# Patient Record
Sex: MALE | Race: WHITE | NOT HISPANIC OR LATINO | Employment: OTHER | URBAN - METROPOLITAN AREA
[De-identification: names, ages, dates, MRNs, and addresses within clinical notes are randomized per-mention and may not be internally consistent; named-entity substitution may affect disease eponyms.]

---

## 2017-11-03 ENCOUNTER — AMBULATORY - HEALTHEAST (OUTPATIENT)
Dept: FAMILY MEDICINE | Facility: CLINIC | Age: 67
End: 2017-11-03

## 2017-11-03 DIAGNOSIS — B07.9 WART: ICD-10-CM

## 2017-11-03 DIAGNOSIS — J30.9 ALLERGIC RHINITIS: ICD-10-CM

## 2017-11-03 DIAGNOSIS — Z00.00 ROUTINE ADULT HEALTH MAINTENANCE: ICD-10-CM

## 2017-11-03 DIAGNOSIS — K21.9 GASTROESOPHAGEAL REFLUX DISEASE WITHOUT ESOPHAGITIS: ICD-10-CM

## 2017-11-03 DIAGNOSIS — J44.9 COPD (CHRONIC OBSTRUCTIVE PULMONARY DISEASE) (H): ICD-10-CM

## 2017-11-03 ASSESSMENT — MIFFLIN-ST. JEOR: SCORE: 1471.49

## 2018-01-09 ENCOUNTER — COMMUNICATION - HEALTHEAST (OUTPATIENT)
Dept: SCHEDULING | Facility: CLINIC | Age: 68
End: 2018-01-09

## 2018-01-09 ENCOUNTER — OFFICE VISIT - HEALTHEAST (OUTPATIENT)
Dept: FAMILY MEDICINE | Facility: CLINIC | Age: 68
End: 2018-01-09

## 2018-01-09 DIAGNOSIS — M67.40 GANGLION CYST: ICD-10-CM

## 2018-01-09 ASSESSMENT — MIFFLIN-ST. JEOR: SCORE: 1493.89

## 2018-01-10 ENCOUNTER — RECORDS - HEALTHEAST (OUTPATIENT)
Dept: ADMINISTRATIVE | Facility: OTHER | Age: 68
End: 2018-01-10

## 2019-06-10 ENCOUNTER — RECORDS - HEALTHEAST (OUTPATIENT)
Dept: ADMINISTRATIVE | Facility: OTHER | Age: 69
End: 2019-06-10

## 2019-07-08 ENCOUNTER — RECORDS - HEALTHEAST (OUTPATIENT)
Dept: ADMINISTRATIVE | Facility: OTHER | Age: 69
End: 2019-07-08

## 2019-07-15 ENCOUNTER — RECORDS - HEALTHEAST (OUTPATIENT)
Dept: ADMINISTRATIVE | Facility: OTHER | Age: 69
End: 2019-07-15

## 2019-09-03 ENCOUNTER — COMMUNICATION - HEALTHEAST (OUTPATIENT)
Dept: FAMILY MEDICINE | Facility: CLINIC | Age: 69
End: 2019-09-03

## 2019-09-30 ENCOUNTER — OFFICE VISIT - HEALTHEAST (OUTPATIENT)
Dept: FAMILY MEDICINE | Facility: CLINIC | Age: 69
End: 2019-09-30

## 2019-09-30 ENCOUNTER — COMMUNICATION - HEALTHEAST (OUTPATIENT)
Dept: SCHEDULING | Facility: CLINIC | Age: 69
End: 2019-09-30

## 2019-09-30 DIAGNOSIS — J44.1 COPD WITH RESPIRATORY DISTRESS, ACUTE (H): ICD-10-CM

## 2019-09-30 DIAGNOSIS — L30.9 ECZEMA, UNSPECIFIED TYPE: ICD-10-CM

## 2019-09-30 DIAGNOSIS — Z00.00 ROUTINE ADULT HEALTH MAINTENANCE: ICD-10-CM

## 2020-01-20 ENCOUNTER — OFFICE VISIT - HEALTHEAST (OUTPATIENT)
Dept: FAMILY MEDICINE | Facility: CLINIC | Age: 70
End: 2020-01-20

## 2020-01-20 DIAGNOSIS — F17.200 SMOKING: ICD-10-CM

## 2020-01-20 DIAGNOSIS — J01.90 ACUTE NON-RECURRENT SINUSITIS, UNSPECIFIED LOCATION: ICD-10-CM

## 2020-03-02 ENCOUNTER — COMMUNICATION - HEALTHEAST (OUTPATIENT)
Dept: FAMILY MEDICINE | Facility: CLINIC | Age: 70
End: 2020-03-02

## 2020-03-02 DIAGNOSIS — J44.1 COPD WITH RESPIRATORY DISTRESS, ACUTE (H): ICD-10-CM

## 2020-11-09 ENCOUNTER — OFFICE VISIT - HEALTHEAST (OUTPATIENT)
Dept: FAMILY MEDICINE | Facility: CLINIC | Age: 70
End: 2020-11-09

## 2020-11-09 DIAGNOSIS — J01.90 ACUTE NON-RECURRENT SINUSITIS, UNSPECIFIED LOCATION: ICD-10-CM

## 2020-12-01 ENCOUNTER — COMMUNICATION - HEALTHEAST (OUTPATIENT)
Dept: FAMILY MEDICINE | Facility: CLINIC | Age: 70
End: 2020-12-01

## 2020-12-16 ENCOUNTER — OFFICE VISIT - HEALTHEAST (OUTPATIENT)
Dept: FAMILY MEDICINE | Facility: CLINIC | Age: 70
End: 2020-12-16

## 2020-12-16 DIAGNOSIS — J01.90 ACUTE NON-RECURRENT SINUSITIS, UNSPECIFIED LOCATION: ICD-10-CM

## 2020-12-16 DIAGNOSIS — L30.9 ECZEMA, UNSPECIFIED TYPE: ICD-10-CM

## 2020-12-16 DIAGNOSIS — J44.1 COPD WITH RESPIRATORY DISTRESS, ACUTE (H): ICD-10-CM

## 2020-12-16 RX ORDER — CLOBETASOL PROPIONATE 0.5 MG/G
CREAM TOPICAL
Qty: 30 G | Refills: 5 | Status: SHIPPED | OUTPATIENT
Start: 2020-12-16 | End: 2021-10-04

## 2021-01-13 ENCOUNTER — OFFICE VISIT - HEALTHEAST (OUTPATIENT)
Dept: FAMILY MEDICINE | Facility: CLINIC | Age: 71
End: 2021-01-13

## 2021-01-13 DIAGNOSIS — E11.9 TYPE 2 DIABETES MELLITUS WITHOUT COMPLICATION, WITHOUT LONG-TERM CURRENT USE OF INSULIN (H): ICD-10-CM

## 2021-01-13 DIAGNOSIS — Z12.5 ENCOUNTER FOR SCREENING FOR MALIGNANT NEOPLASM OF PROSTATE: ICD-10-CM

## 2021-01-13 DIAGNOSIS — N40.1 BENIGN PROSTATIC HYPERPLASIA WITH LOWER URINARY TRACT SYMPTOMS, SYMPTOM DETAILS UNSPECIFIED: ICD-10-CM

## 2021-01-13 DIAGNOSIS — Z00.00 WELLNESS EXAMINATION: ICD-10-CM

## 2021-01-13 DIAGNOSIS — Z12.11 SCREEN FOR COLON CANCER: ICD-10-CM

## 2021-01-13 DIAGNOSIS — J44.1 COPD WITH RESPIRATORY DISTRESS, ACUTE (H): ICD-10-CM

## 2021-01-13 DIAGNOSIS — D22.9 ATYPICAL NEVI: ICD-10-CM

## 2021-01-13 LAB
ANION GAP SERPL CALCULATED.3IONS-SCNC: 12 MMOL/L (ref 5–18)
BUN SERPL-MCNC: 16 MG/DL (ref 8–22)
CALCIUM SERPL-MCNC: 9.4 MG/DL (ref 8.5–10.5)
CHLORIDE BLD-SCNC: 99 MMOL/L (ref 98–107)
CHOLEST SERPL-MCNC: 284 MG/DL
CO2 SERPL-SCNC: 23 MMOL/L (ref 22–31)
CREAT SERPL-MCNC: 0.87 MG/DL (ref 0.7–1.3)
ERYTHROCYTE [DISTWIDTH] IN BLOOD BY AUTOMATED COUNT: 12.2 % (ref 11–14.5)
FASTING STATUS PATIENT QL REPORTED: ABNORMAL
GFR SERPL CREATININE-BSD FRML MDRD: >60 ML/MIN/1.73M2
GLUCOSE BLD-MCNC: 331 MG/DL (ref 70–125)
HBA1C MFR BLD: 12.9 %
HCT VFR BLD AUTO: 49.8 % (ref 40–54)
HDLC SERPL-MCNC: 38 MG/DL
HGB BLD-MCNC: 16.6 G/DL (ref 14–18)
LDLC SERPL CALC-MCNC: 202 MG/DL
MCH RBC QN AUTO: 30.7 PG (ref 27–34)
MCHC RBC AUTO-ENTMCNC: 33.4 G/DL (ref 32–36)
MCV RBC AUTO: 92 FL (ref 80–100)
PLATELET # BLD AUTO: 246 THOU/UL (ref 140–440)
PMV BLD AUTO: 7.6 FL (ref 7–10)
POTASSIUM BLD-SCNC: 5 MMOL/L (ref 3.5–5)
PSA SERPL-MCNC: 1 NG/ML (ref 0–4.5)
RBC # BLD AUTO: 5.41 MILL/UL (ref 4.4–6.2)
SODIUM SERPL-SCNC: 134 MMOL/L (ref 136–145)
TRIGL SERPL-MCNC: 222 MG/DL
WBC: 6.1 THOU/UL (ref 4–11)

## 2021-01-13 RX ORDER — TIOTROPIUM BROMIDE 18 UG/1
18 CAPSULE ORAL; RESPIRATORY (INHALATION) DAILY
Qty: 90 CAPSULE | Refills: 3 | Status: SHIPPED | OUTPATIENT
Start: 2021-01-13 | End: 2021-10-04

## 2021-01-13 RX ORDER — TAMSULOSIN HYDROCHLORIDE 0.4 MG/1
0.4 CAPSULE ORAL
Qty: 90 CAPSULE | Refills: 3 | Status: SHIPPED | OUTPATIENT
Start: 2021-01-13 | End: 2021-10-04

## 2021-01-13 ASSESSMENT — MIFFLIN-ST. JEOR: SCORE: 1474.33

## 2021-01-14 ENCOUNTER — COMMUNICATION - HEALTHEAST (OUTPATIENT)
Dept: FAMILY MEDICINE | Facility: CLINIC | Age: 71
End: 2021-01-14

## 2021-01-14 DIAGNOSIS — J44.1 COPD WITH RESPIRATORY DISTRESS, ACUTE (H): ICD-10-CM

## 2021-01-14 RX ORDER — ALBUTEROL SULFATE 90 UG/1
2 AEROSOL, METERED RESPIRATORY (INHALATION) EVERY 6 HOURS PRN
Qty: 3 EACH | Refills: 0 | Status: SHIPPED | OUTPATIENT
Start: 2021-01-14

## 2021-01-28 ENCOUNTER — AMBULATORY - HEALTHEAST (OUTPATIENT)
Dept: EDUCATION SERVICES | Facility: CLINIC | Age: 71
End: 2021-01-28

## 2021-01-28 ENCOUNTER — COMMUNICATION - HEALTHEAST (OUTPATIENT)
Dept: FAMILY MEDICINE | Facility: CLINIC | Age: 71
End: 2021-01-28

## 2021-01-28 DIAGNOSIS — E11.9 TYPE 2 DIABETES MELLITUS WITHOUT COMPLICATION, WITHOUT LONG-TERM CURRENT USE OF INSULIN (H): ICD-10-CM

## 2021-02-01 ENCOUNTER — COMMUNICATION - HEALTHEAST (OUTPATIENT)
Dept: SCHEDULING | Facility: CLINIC | Age: 71
End: 2021-02-01

## 2021-02-03 ENCOUNTER — RECORDS - HEALTHEAST (OUTPATIENT)
Dept: ADMINISTRATIVE | Facility: OTHER | Age: 71
End: 2021-02-03

## 2021-02-18 ENCOUNTER — RECORDS - HEALTHEAST (OUTPATIENT)
Dept: ADMINISTRATIVE | Facility: OTHER | Age: 71
End: 2021-02-18

## 2021-02-19 ENCOUNTER — AMBULATORY - HEALTHEAST (OUTPATIENT)
Dept: EDUCATION SERVICES | Facility: CLINIC | Age: 71
End: 2021-02-19

## 2021-02-19 DIAGNOSIS — E11.9 TYPE 2 DIABETES MELLITUS WITHOUT COMPLICATION, WITHOUT LONG-TERM CURRENT USE OF INSULIN (H): ICD-10-CM

## 2021-03-19 ENCOUNTER — AMBULATORY - HEALTHEAST (OUTPATIENT)
Dept: EDUCATION SERVICES | Facility: CLINIC | Age: 71
End: 2021-03-19

## 2021-03-19 DIAGNOSIS — E11.9 TYPE 2 DIABETES MELLITUS WITHOUT COMPLICATION, WITHOUT LONG-TERM CURRENT USE OF INSULIN (H): ICD-10-CM

## 2021-04-11 ENCOUNTER — COMMUNICATION - HEALTHEAST (OUTPATIENT)
Dept: SCHEDULING | Facility: CLINIC | Age: 71
End: 2021-04-11

## 2021-04-12 ENCOUNTER — AMBULATORY - HEALTHEAST (OUTPATIENT)
Dept: LAB | Facility: CLINIC | Age: 71
End: 2021-04-12

## 2021-04-12 DIAGNOSIS — E11.9 TYPE 2 DIABETES MELLITUS WITHOUT COMPLICATION, WITHOUT LONG-TERM CURRENT USE OF INSULIN (H): ICD-10-CM

## 2021-04-12 LAB — HBA1C MFR BLD: 6.9 %

## 2021-04-14 ENCOUNTER — COMMUNICATION - HEALTHEAST (OUTPATIENT)
Dept: FAMILY MEDICINE | Facility: CLINIC | Age: 71
End: 2021-04-14

## 2021-04-19 ENCOUNTER — OFFICE VISIT - HEALTHEAST (OUTPATIENT)
Dept: FAMILY MEDICINE | Facility: CLINIC | Age: 71
End: 2021-04-19

## 2021-04-19 DIAGNOSIS — E11.9 TYPE 2 DIABETES MELLITUS WITHOUT COMPLICATION, WITHOUT LONG-TERM CURRENT USE OF INSULIN (H): ICD-10-CM

## 2021-04-19 RX ORDER — METFORMIN HCL 500 MG
1000 TABLET, EXTENDED RELEASE 24 HR ORAL
Qty: 180 TABLET | Refills: 3 | Status: SHIPPED | OUTPATIENT
Start: 2021-04-19 | End: 2022-05-26

## 2021-04-19 ASSESSMENT — MIFFLIN-ST. JEOR: SCORE: 1474.33

## 2021-05-04 ENCOUNTER — AMBULATORY - HEALTHEAST (OUTPATIENT)
Dept: EDUCATION SERVICES | Facility: CLINIC | Age: 71
End: 2021-05-04

## 2021-05-04 DIAGNOSIS — E11.9 TYPE 2 DIABETES MELLITUS WITHOUT COMPLICATION, WITHOUT LONG-TERM CURRENT USE OF INSULIN (H): ICD-10-CM

## 2021-05-29 ENCOUNTER — RECORDS - HEALTHEAST (OUTPATIENT)
Dept: ADMINISTRATIVE | Facility: CLINIC | Age: 71
End: 2021-05-29

## 2021-05-31 VITALS — BODY MASS INDEX: 24.72 KG/M2 | WEIGHT: 163.13 LBS | HEIGHT: 68 IN

## 2021-05-31 VITALS — HEIGHT: 68 IN | WEIGHT: 168.06 LBS | BODY MASS INDEX: 25.47 KG/M2

## 2021-05-31 NOTE — TELEPHONE ENCOUNTER
RN cannot approve Refill Request    RN can NOT refill this medication med is not covered by policy/route to provider. Last office visit: 1/9/2018 Joss Hale MD Last Physical: Visit date not found Last MTM visit: Visit date not found Last visit same specialty: 1/9/2018 Joss Hale MD.  Next visit within 3 mo: Visit date not found  Next physical within 3 mo: Visit date not found      Mila French, [facility] Triage/Med Refill 9/3/2019    Requested Prescriptions   Pending Prescriptions Disp Refills     clobetasol (TEMOVATE) 0.05 % cream 30 g 1     Sig: Apply to affected BID x 14D       There is no refill protocol information for this order

## 2021-05-31 NOTE — TELEPHONE ENCOUNTER
Medication Question or Clarification  Who is calling: Patient  What medication are you calling about? (include dose and sig) Albuterol  Who prescribed the medication?: NA  What is your question/concern?: The patient states he has been told previously to have the above script on hand for his lung disease and would like this inhaler.   Pharmacy: [id number] [place] [phone] [place] [facility] [place] [address]   Okay to leave a detailed message?: Yes  Site CMT - Please call the pharmacy to obtain any additional needed information.

## 2021-05-31 NOTE — TELEPHONE ENCOUNTER
Last seen 7/9/18- pt notified via detailed phone voicemail message that he needs an appt or call his lung doctor for refill. We'd be happy to see him this week if his lung doctor wont fill. H.DeGree, CMA

## 2021-06-01 NOTE — TELEPHONE ENCOUNTER
Triage call:   Flu shot and pneumonia shot- done today. Reports that about 6-7 hours after having the office visit her started to have difficulty breathing. Reports that when he lays down he cannot catch his breath. Gets winded with minimal exertion. Wife is in the background and reports that he is winded speaking to triage nurse. Patient states that he did not feel like this when he was seen today.     Reports that Albuterol helped his symptoms some but the shortness of breath returned.  If he rests he is ok. Temp 98.8F- gets easily winded when up walking.     Triaged to be seen in the ER at this time. Patient agrees to go in to be seen. Will go to  ER for evaluation.     Chasity Iraheta RN BSBA [facility] Triage/Med Refill 9/30/2019 6:26 PM    Reason for Disposition    [1] MODERATE difficulty breathing (e.g., speaks in phrases, SOB even at rest, pulse 100-120) AND [2] NEW-onset or WORSE than normal    Protocols used: BREATHING DIFFICULTY-A-AH

## 2021-06-01 NOTE — PROGRESS NOTES
Chief Complaint   Patient presents with     Eczema     Pt c/o eczema flare up and requests refill     COPD     Pt requests inhaler       HPI: Ranjit presents today for recheck of his eczema.  It is flaring up and he would like refill of the clobetasol cream which worked well for him.  It seems to be worse this time of year.    He also has COPD.  He started smoking again and we encourage smoking cessation.  We discussed ways to stop smoking.    The patient wants to continue his Spiriva and Advair and would like an albuterol MDI to be used in cases of urgent need.    ROS: No fever.  Occasional cough.  Positive pruritus of the skin    SH:    reports that he has been smoking. He does not have any smokeless tobacco history on file.      FH: The Patient's family history is not on file.     Meds:  Nakul has a current medication list which includes the following prescription(s): clobetasol, fluticasone propion-salmeterol, methylprednisolone, methylprednisolone, tiotropium, and albuterol.    O:  /70   Pulse 71   Resp 16   Wt 170 lb 7 oz (77.3 kg)   SpO2 95%   BMI 26.30 kg/m    Alert conversant no acute distress  Skin Pink and dry  Lungs clear to auscultation but diminished consistent with COPD  Heart regular rate and rhythm  Psych oriented she has good memory insight and judgment  Abdomen soft nontender      A/P:   1. Routine adult health maintenance  - Pneumococcal conjugate vaccine 13-valent 6wks-17yrs; >50yrs  - Influenza High Dose,Seasonal,PF 65+ Yrs    2. Eczema, unspecified type  - clobetasol (TEMOVATE) 0.05 % cream; Apply to affected BID x 14D  Dispense: 30 g; Refill: 5    3. COPD with respiratory distress, acute (H)  Encourage smoking cessation  - fluticasone propion-salmeterol (ADVAIR DISKUS) 250-50 mcg/dose DISKUS; Inhale 1 puff 2 (two) times a day.  Dispense: 3 each; Refill: 3  - tiotropium (SPIRIVA WITH HANDIHALER) 18 mcg inhalation capsule; Place 1 capsule (2 puffs total) into inhaler and inhale daily.   Dispense: 90 capsule; Refill: 3  - albuterol (PROAIR HFA;PROVENTIL HFA;VENTOLIN HFA) 90 mcg/actuation inhaler; Inhale 2 puffs every 6 (six) hours as needed for wheezing.  Dispense: 1 each; Refill: 0    4.  Smoking  -Encouraged cessation    RTC 6 months

## 2021-06-03 VITALS
HEART RATE: 71 BPM | BODY MASS INDEX: 26.3 KG/M2 | DIASTOLIC BLOOD PRESSURE: 70 MMHG | RESPIRATION RATE: 16 BRPM | SYSTOLIC BLOOD PRESSURE: 118 MMHG | WEIGHT: 170.44 LBS | OXYGEN SATURATION: 95 %

## 2021-06-04 VITALS
WEIGHT: 179.06 LBS | DIASTOLIC BLOOD PRESSURE: 80 MMHG | BODY MASS INDEX: 27.63 KG/M2 | TEMPERATURE: 98.4 F | HEART RATE: 85 BPM | SYSTOLIC BLOOD PRESSURE: 148 MMHG | RESPIRATION RATE: 16 BRPM | OXYGEN SATURATION: 95 %

## 2021-06-04 VITALS
RESPIRATION RATE: 16 BRPM | HEART RATE: 78 BPM | DIASTOLIC BLOOD PRESSURE: 60 MMHG | OXYGEN SATURATION: 95 % | WEIGHT: 171 LBS | BODY MASS INDEX: 26.39 KG/M2 | SYSTOLIC BLOOD PRESSURE: 120 MMHG

## 2021-06-05 VITALS
HEART RATE: 81 BPM | OXYGEN SATURATION: 94 % | SYSTOLIC BLOOD PRESSURE: 128 MMHG | DIASTOLIC BLOOD PRESSURE: 66 MMHG | RESPIRATION RATE: 18 BRPM | HEIGHT: 68 IN | WEIGHT: 162 LBS | BODY MASS INDEX: 24.55 KG/M2

## 2021-06-05 VITALS
SYSTOLIC BLOOD PRESSURE: 124 MMHG | OXYGEN SATURATION: 96 % | RESPIRATION RATE: 16 BRPM | DIASTOLIC BLOOD PRESSURE: 80 MMHG | HEART RATE: 46 BPM | BODY MASS INDEX: 27.21 KG/M2 | TEMPERATURE: 97.6 F | WEIGHT: 176.31 LBS

## 2021-06-05 VITALS
DIASTOLIC BLOOD PRESSURE: 82 MMHG | WEIGHT: 162 LBS | HEART RATE: 83 BPM | HEIGHT: 68 IN | OXYGEN SATURATION: 96 % | SYSTOLIC BLOOD PRESSURE: 134 MMHG | RESPIRATION RATE: 18 BRPM | BODY MASS INDEX: 24.55 KG/M2

## 2021-06-05 NOTE — PROGRESS NOTES
Chief Complaint   Patient presents with     Sinus Problem     Pt c/o cold sx, coughing up phlegm, he thinks he has a sinus infection     Recurrent Skin Infections     Pt c/o recurring boil R upper thigh       HPI: Patient presents with 2 concerns.  The first is pain in and mucus in his sinuses for the past several weeks.  He notes that he has had sinus pain he is using [place] pot.  He has had no fever.    Unfortunately he started smoking again.    He had a lesion on his right thigh lateral aspect and noticed that it was draining.  It is erythematous.  He put some triamcinolone cream on it which seemed to make it worse.    ROS: No fever.  No vomiting or diarrhea.    SH:    reports that he has been smoking. He does not have any smokeless tobacco history on file.      FH: The Patient's family history is not on file.     Meds: Nakul has a current medication list which includes the following prescription(s): albuterol, clobetasol, fluticasone propion-salmeterol, and tiotropium.    O:  /60   Pulse 78   Resp 16   Wt 171 lb (77.6 kg)   SpO2 95%   BMI 26.39 kg/m     Constitutional:    --Vitals as above  --No acute distress  Eyes-  --Sclera noninjected  --Lids and conjunctiva normal  ENT-  --TMs clear  --Sclera noninjected  --Pharynx mildly erythematous with pain to palpation of both maxillary sinuses  Neck-  --Neck supple    Lymph-  --No cervical lymphadenopathy  Lungs-  --Clear to Auscultation  Heart-  --Regular rate and rhythm  Skin-  --Pink and dry except for a 1/2 cm round area on the right thigh consistent with skin abscess that is healing well    A/P:   1. Acute non-recurrent sinusitis, unspecified location  Augmentin 875 twice daily for 14 days.  Continue [place] pot.    2. Smoking  Strongly encourage smoke cessation    3.  Skin abscess  -Healing well.  Augmentin should help.

## 2021-06-06 NOTE — TELEPHONE ENCOUNTER
Who is calling:  Patient   Reason for Call:  Patient states [facility] insurence will fax to clinic to verify if patient was prescribed Tiotropium and albuterol inhaler by provider , patient requesting clinic staff to respond As soon as possible  .  Date of last appointment with primary care: 01/20/20  Okay to leave a detailed message: No

## 2021-06-06 NOTE — TELEPHONE ENCOUNTER
Spoke to patient as it is not clear what was needed. [facility]

## 2021-06-12 NOTE — PROGRESS NOTES
S:  Very pleasant 70-year-old male with a history of smoking and mild COPD presents today with nasal congestion facial pain and cough for the past 3 to 5 days in duration.  He has a past medical history of sinusitis.    Medications: Butyryl as needed, Advair twice daily, Spiriva, Temovate as needed    O:   Blood pressure 148/80 pulse 85 respiration 16 temperature 98.4  Constitutional:    --Vitals as above  --No acute distress  Eyes-  --Sclera noninjected  --Lids and conjunctiva normal  ENT-  --TMs clear  --Sclera noninjected  --Pharynx moderately erythematous  --Pain to palpation of the sinuses bilaterally in the maxillary area  Neck-  --Neck supple    Lymph-  --No cervical lymphadenopathy  Lungs-  --Clear to Auscultation  Heart-  --Regular rate and rhythm  Skin-  --Pink and dry          A:   Sinusitis  Smoking  COPD    P:   Continue Advair  Continue Spiriva  Encourage smoke cessation  Augmentin 75 twice daily 14 days  Increase fluids and rest  Return if not improving

## 2021-06-13 NOTE — PROGRESS NOTES
S:  Patient presents today for recurrent sinus infection.  Past visit from 11/9/2020 reviewed showing that he had Augmentin for 14 days which had mild effect but then the pain in his face and copious amounts of rhinorrhea recurred.  He has rhinorrhea, is using [place] frequently, continues to smoke.    He would also like refills of his albuterol which he uses intermittently.  He continues on tiotropium for his COPD as well.    Medications: Albuterol as needed, Temovate as needed, Advair daily, tiotropium daily.    O:   Blood pressure 124/80 pulse 46 respiration 16 temperature 97.6  Constitutional:    --Vitals as above  --No acute distress  Eyes-  --Sclera noninjected  --Lids and conjunctiva normal  ENT-  --TMs clear  --Sclera noninjected  --Pharynx moderately erythematous  --Bilateral maxillofacial pain to palpation  Neck-  --Neck supple    Lymph-  --No cervical lymphadenopathy  Lungs-  --Clear to Auscultation  Heart-  --Regular rate and rhythm  Skin-  --Pink and dry    A:   Sinusitis  COPD  Smoking    P:   The patient failed Augmentin.  We will start Levaquin 750 daily x7 days we discussed Achilles tendon side effects.  Medrol Dosepak  Refilled albuterol  Return for regular exams

## 2021-06-13 NOTE — TELEPHONE ENCOUNTER
Patient is still having congestion and cough no fever. He said the antibiotic did not work. He has never had a covid test.

## 2021-06-13 NOTE — TELEPHONE ENCOUNTER
Did you want to make a virtual visit with pt since you last saw on 11/9/20? Or did you want to order a covid test?    Arlen Carrillo, CMA

## 2021-06-13 NOTE — PROGRESS NOTES
"Chief Complaint   Patient presents with     Follow-up     wart removal, R foot under big toe                HPI: Very pleasant 66-year-old male who is a , presents to discuss chronic medical issues.  He has a wart under his right great toe it has been bothering him for several months he would like treated.    His COPD has been stable and he is using Spiriva and Advair with good results.  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  He gets sick quickly and is frustrated that he has to get treated with antibiotics and steroids and has difficulty doing this.    ROS: No sputum production.  No respiratory distress.  No melena or hematochezia vomiting or diarrhea or fever.  All other systems reviewed and were negative.    SH: He is a daily smoker     FH: Negative    Meds:  Nakul has a current medication list which includes the following prescription(s): fluticasone-salmeterol, tiotropium, azithromycin, and methylprednisolone.    O:  /64  Pulse 80  Temp 98  F (36.7  C)  Resp 16  Ht 5' 7.5\" (1.715 m)  Wt 163 lb 2 oz (74 kg)  BMI 25.17 kg/m2     Lungs--Clear to Auscultation  Heart--Regular rate and rhythm  Abdomen--Soft, non-tender, non-distended  Skin-Pink and dry     A/P:   1. Routine adult health maintenance    - Influenza High Dose, Seasonal 65+ yrs    2. COPD (chronic obstructive pulmonary disease)  -Continue Spiriva and Advair.  -Encourage smoking cessation    3. Wart  6 mm x 9 mm wart on the right great toe treated with cryotherapy without complication    4. Allergic rhinitis  -Continue over-the-counter H2 blocker    5. Gastroesophageal reflux disease without esophagitis  -Continue PPI OTC    Return to clinic in 6 months                                          "

## 2021-06-14 NOTE — TELEPHONE ENCOUNTER
Please phone patient. Diabetic educator had suggested that patient may better tolerate extended release preparation of metformin as the immediate release was causing some GI upset.  Please advise that patient discontinue regular metformin.  Instead he should start taking metformin XR.  New rx for metformin XR 500mg - take 2 tablets by mouth once daily - was sent to patient's pharmacy.   Thanks,  Hernan Cope MD  For Hale

## 2021-06-14 NOTE — TELEPHONE ENCOUNTER
Pt requesting 90 day supply of Albuterol inhaler/  Visit 1/13/21- one albuterol inhaler refilled for patient.       Order pending.

## 2021-06-14 NOTE — PROGRESS NOTES
S:  Ranjit presents for annual exam.  He had a visiting nurse come to his home who scared him and told him that he had peripheral vascular disease and hypertension.  His blood pressure during that exam was 128/68.  His BMI was 25-1/2, and he had no leg pain.    He is frustrated with the coronavirus restrictions and is eager to have the vaccine.  We discussed vaccine in detail.  He continues to read the Bible daily.    He has multiple moles 1 on his forearm and 3 on his right thigh that he would like evaluated.    He continues to smoke and is aware he needs to stop.    Medications: Albuterol as needed, clobetasol as needed, fluticasone, Spiriva.    O:   Blood pressure 134/82 pulse 83 respiration 16 O2 sat 96% on room air  Constitutional:    --Vitals as above  --No acute distress  Eyes-  --Sclera noninjected  --Lids and conjunctiva normal  ENT-  --TMs clear  --Sclera noninjected  --Pharynx not erythematous  Neck-  --Neck supple with no cervical lymphadenopathy  Lungs-  --Clear to Auscultation  Heart-  --Regular rate and rhythm  Abdomen--  --Soft, non-tender, non-distended  Skin-  --Pink and dry except for one 4 mm multicolored circular nevi on the right forearm.  Psych-  --Alert and oriented  --Normal affect      A:   Suspicious nevi  Tobacco abuse  COPD    P:   Continue Spiriva  Continue butyryl as needed  Encourage smoke cessation  Referral to dermatology  Continue exercise  CBC CMP lipid PSA A1c  RTC 1 year  Addendum: Patient's A1c came back elevated at 12.9.  Ordered Metformin 500 twice daily and discussed this with the patient on the phone and he agreed to take this medication.  I also order diabetic education.  Patient agreed to follow-up after diabetic education is complete for repeat office visit.

## 2021-06-14 NOTE — TELEPHONE ENCOUNTER
I would have him hold his Metformin the AM of the colonoscopy and take it when he gets home from the colonoscopy and is able to eat.    Dr. Joe

## 2021-06-14 NOTE — TELEPHONE ENCOUNTER
It looks like Dr jones reviewed results with patient on the phone on 1/13, when he had adjusted his metformin dose and advised that he see diabetes education.   Okay to send him a copy of his results.  Blood counts, PSA, electrolytes, kidney function tests were all normal.  His cholesterol was rather elevated, as was the blood sugar and A1c.     Hernan Cope MD

## 2021-06-14 NOTE — TELEPHONE ENCOUNTER
Patient is scheduled for colonoscopy on 2/3/21 and per instructions he was given he is to check with PCP regarding if he is to take his prescribed metformin the morning of procedure.    Karen Perry RN  [facility] Triage Nurse Advisor    Please close encounter when completed.

## 2021-06-14 NOTE — PROGRESS NOTES
Type of Service: Telephone Visit/ 60 minutes    Patient would like to receive their AVS by AVS Preference: Mail a copy.     Assessment:   --initial DM education for Nakul, significant other Linda also present for the phone call.  --patient noted sleepiness, thirst, frequent urination prior to finding out he had diabetes, these symptoms have improved since starting medication and making lifestyle changes  --patient reports of a 12# weight loss since finding out he has diabetes, and he has been making dietary changes.  --he is raking off his roof/shoveling the snow, daily 30-45 minutes  --patient has increased his water intake to 8+ glasses/day, has tea/coffee, no sugary beverages    Blood glucose record:  Prescriptions for testing supplies sent 1/28/21    Medications prescribed:  Metformin 500 mg twice daily    Education:  --discussed what his Type 2 diabetes, SMBG, Basic CHO counting, reading labels  --reviewed s/s of hypoglycemia and hyperglycemia and treatment, BG/A1C goals, healthy eating, snacking, benefits of exercise, foot care    Plan:   1. Test glucose daily: fasting.  2. Limit CHO at meals to 45-60 grams, 15-30 grams for snacks.  3. Exercise 150 minutes week.  4. Follow up with educator on 2/19/21    Subjective and Objective:      Nakul Iniguez is referred by Joss Hale for Diabetes Education.     Lab Results   Component Value Date    HGBA1C 12.9 (H) 01/13/2021               Education:     Monitoring   Meter (per above goals): Assessed, Discussed and Literature provided  Monitoring: Assessed, Discussed and Literature provided  BG goals: Assessed, Discussed and Literature provided    Nutrition Management  Nutrition Management: Assessed, Discussed and Literature provided  Weight: Assessed and Discussed  Portions/Balance: Assessed, Discussed and Literature provided  Carb ID/Count: Assessed, Discussed and Literature provided  Label Reading: Assessed, Discussed and Literature provided  Heart  Healthy Fats: Assessed, Discussed and Literature provided  Menu Planning: Assessed and Discussed  Dining Out: Not addressed  Physical Activity: Assessed and Discussed    Orals: Assessed, Discussed and Literature provided    Diabetes Disease Process: Assessed, Discussed and Literature provided    Acute Complications: Prevent, Detect, Treat:  Hypoglycemia: Assessed, Discussed and Literature provided  Hyperglycemia: Assessed, Discussed and Literature provided  Sick Days: Literature provided  Driving: Not addressed    Chronic Complications  Foot Care:Assessed, Discussed and Literature provided  Skin Care: Assessed, Discussed and Literature provided  Eye: Not addressed  ABC: Assessed  Teeth:Not addressed  Goal Setting and Problem Solving: Assessed and Discussed  Barriers: Assessed and Discussed  Psychosocial Adjustments: Assessed and Discussed      Time spent with the patient: 60 minutes for diabetes education and counseling.   Previous Education: no  Visit Type:DSMT  Hours Remainin, DSMT 9 and MNT 3      Karen Boyle  2021

## 2021-06-15 NOTE — PROGRESS NOTES
Type of Service: Telephone Visit/ 60 minutes    Assessment:   --follow up visit for Nakul, and significant other, Linda.  --patient reported 20# weight loss since 12/2020, he is at 157# at home, he would like to get down to 155# for a goal weight.  --three meals plus snacks:  Am: fruit/eggs/spelt bread/vegetable juice  Lunch: wild rice/ lean protein/vegetables  Dinner: whole grain/lean protein/vegetables  Snacks: yogurt/berries with yo seeds and flaxseed  --exercise as waned a bit in the last few weeks due to cold weather, more walking will happen when it warms up per patient.  --water 8+ cups/day  --patient started taking Equaline fiber, adding 15-18 grams/fiber/day per PCP  --no eye or dental exam in recent years    Blood glucose record:  Educator did not see the meter  FBS: 112,136,117,119,118,104,126,  Per patient since 2/3/21, he has had only one FBS reading > 130 mg/dl    Medications prescribed:  Metformin  mg twice daily    Education:  --reviewed BG record, goals for BG, A1C  --reviewed nutritional intake, changes made, CHO foods, portions, healthy snacks and hydration  --discussed preventative care: ABC's, teeth, eyes, support/sick day care/stress/depression/resources    Plan:   1. Test glucose once daily: alternate between fasting and two hours post meal.  2. Add in exercise, targeting 150 minutes/week.  3. Schedule eye and dental exam.  4. Follow up with educator on 3/29/21.    Subjective and Objective:      Nakul Iniguez is referred by Joss Hale for Diabetes Education.     Lab Results   Component Value Date    HGBA1C 12.9 (H) 01/13/2021               Education:     Monitoring   Meter (per above goals): Assessed and Discussed  Monitoring: Assessed and Discussed  BG goals: Assessed and Discussed    Nutrition Management  Nutrition Management: Assessed and Discussed  Weight: Assessed and Discussed  Portions/Balance: Assessed and Discussed  Carb ID/Count: Assessed and  Discussed  Label Reading: Assessed and Discussed  Heart Healthy Fats: Assessed  Menu Planning: Assessed and Discussed  Dining Out: Not addressed  Physical Activity: Assessed and Discussed    Orals: Assessed and Discussed    Diabetes Disease Process: Assessed and Discussed    Acute Complications: Prevent, Detect, Treat:  Hypoglycemia: Assessed  Hyperglycemia: Assessed and Discussed  Sick Days: Assessed, Discussed and Literature provided  Driving: Not addressed    Chronic Complications  Foot Care:Assessed, Discussed and Literature provided  Skin Care: Assessed, Discussed and Literature provided  Eye: Assessed, Discussed and needs to make an appointment for   ABC: Assessed and Discussed  Teeth:Assessed, Discussed and needs to make an appointment for   Goal Setting and Problem Solving: Assessed and Discussed  Barriers: Assessed  Psychosocial Adjustments: Assessed      Time spent with the patient: 60 minutes for diabetes education and counseling.   Previous Education: yes  Visit Type:DSMT  Hours Remainin, DSMT 8 and MNT 3      Karen Boyle  2021

## 2021-06-15 NOTE — PROGRESS NOTES
DATE OF SERVICE: 01/09/2018    SUBJECTIVE:  A 67-year-old male who presents today with multiple issues.  First is a  rash on his right flank region.  It has been there for 3 to 4 weeks in duration.  He  has tried multiple medications to try to alleviate the rash.  ROS negative for  fever, chills, nausea, vomiting or diarrhea.    He also has continued wart on his right MTP lateral aspect.  In addition, he has a  cyst on his left ring finger volar aspect of the PIP joint.  It has been there for  several weeks and it bothers him and is particularly when he plays guitar.    OBJECTIVE:  VITAL SIGNS:  The patient is alert, conversant, in no acute distress.  The vital  signs are stable.  The examination of the right thorax shows large confluent area of  red, rough excoriated skin consistent with eczema.  Examination of the left ring  finger shows a ganglion cyst approximately 6 mm in size on the volar aspect.  The  examination of the right foot MTP shows a verrucous vulgaris.    ASSESSMENT:    1.  Wart.  2.  Ganglionic cyst.  3.  Eczema.      PLAN:  1.  Treatment with prednisone, Medrol Dosepak.  2.  Temovate cream b.i.d.  3.  Took a sharp scalpel and debrided the wart that has been frozen in the past and  froze it again.  4.  Referral to orthopedics for ganglion cyst.      PRAKASH HERRERA MD  ca  D 01/09/2018 14:42:33  T 01/09/2018 16:09:22  R 01/09/2018 16:09:22  [id number]        cc: ALIA HERRERA MD

## 2021-06-16 PROBLEM — E11.9 DIABETES MELLITUS, TYPE 2 (H): Status: ACTIVE | Noted: 2021-01-13

## 2021-06-16 NOTE — TELEPHONE ENCOUNTER
Reason for Call:  Request for results:    Name of test or procedure: AIC    Date of test of procedure: LAST WEEK    Location of the test or procedure: [facility]    OK to leave the result message on voice mail or with a family member? No    Phone number Patient can be reached at: Home number on file [phone] (home)    Additional comments: WOULD LIKE HIS RESULTS AND HOW TO LOWER THE A1C #,WHAT CAN HE DO TO MAKE IT BETTER?    Call taken on 4/14/2021 at 11:19 AM by Frances Grant

## 2021-06-16 NOTE — PROGRESS NOTES
S:  Ranjit is doing a remarkable job with his health.  His A1c dropped to 6.9 in 3 months.  He is exercising frequently, eating healthy, and monitoring his blood sugars closely.    He has never had any low blood sugars, he has no diabetic neuropathy and his eye exam is up-to-date.    Medications: Albuterol, clobetasol, Metformin, tamsulosin and Spiriva    O:   Blood pressure 128/66, pulse 81 respiration 18 weight 162  Alert conversant good distress  Skin Pink and dry  Psych orient x3 with good memory insight and judgment    A:   Diabetes    P:   Ranjit is doing a remarkable job.  Strongly encouraged continued exercise and weight loss.  Continue Metformin which she is tolerating well.  Check A1c in 6 months.  RTC 6 months

## 2021-06-16 NOTE — TELEPHONE ENCOUNTER
Next week patient is getting his A1C checked.  Pt is wanting to know if he needs to fast before the test, pt will call clinic tomorrow to verify.  Jacinta Bass RN, [facility]    Triage Nurse Advisor    Additional Information    Negative: Nursing judgment    Protocols used: NO GUIDELINE OR REFERENCE [id number]

## 2021-06-16 NOTE — PROGRESS NOTES
Type of Service: Telephone Visit      Assessment:   --follow up visit for Nakul  --patient is doing some stretching, but he has not been walking, he needs new shoes, he is worried about his feet  --he has not scheduled his eye or dental exam as of yet, he has had other medical appointments, so it is not feasible at this time  --he is keeping track of everything he eats, significant other is making healthy meals  --hydrating with 8+ glasses of water/day  --patient is still taking equaline fiber, adding 15-18 grams/day     Blood glucose record:  FBS: 111,119,111,107  Post Meal:,135,120,118,128,115,84,125,132,132    Medications prescribed:  Metformin  mg twice daily    Education:  --review BG record, goals for BG and A1C  --reviewed dietary changes, hydration and benefits of adding exercise when he is able to purchase new shoes  --discussed preventative exams/ foot care    Plan:   1. Test glucose twice daily: fasting and two hours post meal/  2. Follow up with A1C check 4/12/21  3. Schedule dental and eye exam when feasible  4. Add in walking when he purchases new shoes, 2-4x/week.  5. Follow up with educator on 5/4/21    Subjective and Objective:      Nakul Iniguez is referred by Joss Hale for Diabetes Education.     Lab Results   Component Value Date    HGBA1C 12.9 (H) 01/13/2021     Wt Readings from Last 3 Encounters:   01/13/21 162 lb (73.5 kg)   12/16/20 176 lb 5 oz (80 kg)   11/09/20 179 lb 1 oz (81.2 kg)                 Education:     Monitoring   Meter (per above goals): Assessed and Discussed  Monitoring: Assessed and Discussed  BG goals: Assessed and Discussed    Nutrition Management  Nutrition Management: Assessed and Discussed  Weight: Not addressed  Portions/Balance: Assessed and Discussed  Carb ID/Count: Competent  Label Reading: Not addressed  Heart Healthy Fats: Not addressed  Menu Planning: Assessed and Discussed  Dining Out: Not addressed  Physical Activity: Assessed and  Discussed    Orals: Assessed and Discussed    Diabetes Disease Process: Assessed and Discussed    Acute Complications: Prevent, Detect, Treat:  Hypoglycemia: Assessed  Hyperglycemia: Assessed  Sick Days: Assessed, Discussed and Literature provided  Driving: Not addressed    Chronic Complications  Foot Care:Assessed and Discussed  Skin Care: Assessed and Discussed  Eye: Assessed and Discussed  ABC: Assessed  Teeth:Assessed and Discussed  Goal Setting and Problem Solving: Assessed and Discussed  Barriers: Assessed  Psychosocial Adjustments: Assessed      Time spent with the patient: 20 minutes for diabetes education and counseling.   Previous Education: yes  Visit Type:DSMT  Hours Remainin, DSMT 7.5 and MNT 3      Karen Boyle  3/19/2021

## 2021-06-17 NOTE — PROGRESS NOTES
Type of Service: Telephone Visit/ 20 minutes    Patient would like to receive their AVS by     Assessment:   --follow up visit for nakul  --patient did purchase new shoes within the past few weeks and has started to do more yardwork/activity outdoors, he hopes to be able to add in more walking as well.  --patient's significant other is watching fiber/calories and  CHO content of all foods  --patient has maintained weight between 153-156# which is his desired weight range.  --he continues to hydrate with water.  --patient is considering dental appointment soon, as he stated that he may need some work, he will schedule eye exam when feasible.    Blood glucose record: Patient is now checking 3-4x/week  FBS: 111,108,105  Post meal: 118,106,123,113    Medications prescribed:  Metformin  mg two tablets daily    Education:  --reviewed BG readings, goals for BG, new A1C results, healthy weight range, exercise goals.  --discussed nutrition: fiber content/CHO monitoring  --discussed preventative care: eyes, teeth      Plan:   1. Keep testing glucose at least 4x/week: alternate between fasting and two hours post meal.  2. Schedule dental exam, eye exam when feasible.  3. Walk or do yard work 3x/week.  4. Take medications as prescribed.  5. Follow up with PCP as directed.      Subjective and Objective:      Nakul Iniguez is referred by Joss jones for Diabetes Education.     Lab Results   Component Value Date    HGBA1C 6.9 (H) 04/12/2021           Education:     Monitoring   Meter (per above goals): Assessed and Discussed  Monitoring: Assessed and Discussed  BG goals: Assessed and Discussed    Nutrition Management  Nutrition Management: Assessed and Discussed  Weight: Assessed and Discussed  Portions/Balance: Competent  Carb ID/Count: Competent  Label Reading: Competent  Heart Healthy Fats: Not addressed  Menu Planning: Competent  Dining Out: Not addressed  Physical Activity: Assessed and Discussed    Orals:  Assessed and Discussed    Diabetes Disease Process: Assessed and Discussed    Acute Complications: Prevent, Detect, Treat:  Hypoglycemia: Assessed  Hyperglycemia: Assessed  Sick Days: Assessed  Driving: Not addressed    Chronic Complications  Foot Care:Not addressed  Skin Care: Not addressed  Eye: patient will make appointment when feasible,   ABC: Assessed  Teeth:Assessed, Discussed and patient plans to make appointment soon for dental care  Goal Setting and Problem Solving: Assessed and Discussed  Barriers: Assessed  Psychosocial Adjustments: Assessed      Time spent with the patient: 20 minutes for diabetes education and counseling.   Previous Education: yes  Visit Type:DSMT  Hours Remainin, DSMT 7 and MNT 3      Karen Boyle  2021

## 2021-06-21 NOTE — LETTER
Letter by Hernan Cope MD at      Author: Hernan Cope MD Service: -- Author Type: --    Filed:  Encounter Date: 1/28/2021 Status: (Other)         Nakul Iniguez  [address] [facility] [address]      January 29, 2021      Dear Mr. Iniguez,    Enclosed are the results of your most recent lab work you requested.       Please call if you have any questions    Sincerely,        [facility]/

## 2021-07-03 NOTE — ADDENDUM NOTE
Addendum Note by Bill Drew at 3/3/2020  8:41 AM     Author: Bill Drew Service: -- Author Type: --    Filed: 3/3/2020  8:41 AM Encounter Date: 3/2/2020 Status: Signed    : Bill Drew    Addended by: BILL DREW on: 3/3/2020 08:41 AM        Modules accepted: Orders

## 2021-07-03 NOTE — ADDENDUM NOTE
Addendum Note by Joss Hale MD at 3/3/2020 10:10 AM     Author: Joss Hale MD Service: -- Author Type: Physician    Filed: 3/3/2020 10:10 AM Encounter Date: 3/2/2020 Status: Signed    : Joss Hale MD (Physician)    Addended by: JOSS HALE on: 3/3/2020 10:10 AM        Modules accepted: Orders

## 2021-07-03 NOTE — ADDENDUM NOTE
Addendum Note by Joss Hale MD at 1/13/2021  9:00 AM     Author: Joss Hale MD Service: -- Author Type: Physician    Filed: 1/13/2021  3:16 PM Encounter Date: 1/13/2021 Status: Signed    : Joss Hale MD (Physician)    Addended by: JOSS HALE on: 1/13/2021 03:16 PM        Modules accepted: Orders

## 2021-08-17 ENCOUNTER — TELEPHONE (OUTPATIENT)
Dept: URGENT CARE | Facility: URGENT CARE | Age: 71
End: 2021-08-17

## 2021-08-17 NOTE — TELEPHONE ENCOUNTER
Left message to call back for: pt  Information to relay to patient: pt due for est care visit as Dr. Hale is retired. Dr. Joe or Hosea Esparza CNP would be good choices.

## 2021-10-04 ENCOUNTER — OFFICE VISIT (OUTPATIENT)
Dept: FAMILY MEDICINE | Facility: CLINIC | Age: 71
End: 2021-10-04
Payer: COMMERCIAL

## 2021-10-04 VITALS
WEIGHT: 153.6 LBS | OXYGEN SATURATION: 96 % | BODY MASS INDEX: 23.35 KG/M2 | DIASTOLIC BLOOD PRESSURE: 70 MMHG | SYSTOLIC BLOOD PRESSURE: 130 MMHG | HEART RATE: 70 BPM

## 2021-10-04 DIAGNOSIS — J30.1 NON-SEASONAL ALLERGIC RHINITIS DUE TO POLLEN: ICD-10-CM

## 2021-10-04 DIAGNOSIS — Z11.59 ENCOUNTER FOR HEPATITIS C SCREENING TEST FOR LOW RISK PATIENT: ICD-10-CM

## 2021-10-04 DIAGNOSIS — Z23 HIGH PRIORITY FOR 2019-NCOV VACCINE: ICD-10-CM

## 2021-10-04 DIAGNOSIS — E11.9 TYPE 2 DIABETES MELLITUS WITHOUT COMPLICATION, WITHOUT LONG-TERM CURRENT USE OF INSULIN (H): Primary | ICD-10-CM

## 2021-10-04 DIAGNOSIS — E78.2 MIXED HYPERLIPIDEMIA: ICD-10-CM

## 2021-10-04 DIAGNOSIS — F17.200 NICOTINE DEPENDENCE WITH CURRENT USE: ICD-10-CM

## 2021-10-04 DIAGNOSIS — L30.9 ECZEMA, UNSPECIFIED TYPE: ICD-10-CM

## 2021-10-04 DIAGNOSIS — Z00.00 HEALTH CARE MAINTENANCE: ICD-10-CM

## 2021-10-04 DIAGNOSIS — J44.1 COPD WITH RESPIRATORY DISTRESS, ACUTE (H): ICD-10-CM

## 2021-10-04 DIAGNOSIS — N40.1 BENIGN PROSTATIC HYPERPLASIA WITH LOWER URINARY TRACT SYMPTOMS, SYMPTOM DETAILS UNSPECIFIED: ICD-10-CM

## 2021-10-04 LAB
ALBUMIN SERPL-MCNC: 4.1 G/DL (ref 3.5–5)
ALP SERPL-CCNC: 62 U/L (ref 45–120)
ALT SERPL W P-5'-P-CCNC: 22 U/L (ref 0–45)
ANION GAP SERPL CALCULATED.3IONS-SCNC: 9 MMOL/L (ref 5–18)
AST SERPL W P-5'-P-CCNC: 15 U/L (ref 0–40)
BILIRUB SERPL-MCNC: 0.5 MG/DL (ref 0–1)
BUN SERPL-MCNC: 10 MG/DL (ref 8–22)
CALCIUM SERPL-MCNC: 9.5 MG/DL (ref 8.5–10.5)
CHLORIDE BLD-SCNC: 99 MMOL/L (ref 98–107)
CHOLEST SERPL-MCNC: 233 MG/DL
CO2 SERPL-SCNC: 25 MMOL/L (ref 22–31)
CREAT SERPL-MCNC: 0.76 MG/DL (ref 0.7–1.3)
CREAT UR-MCNC: 90 MG/DL
FASTING STATUS PATIENT QL REPORTED: YES
GFR SERPL CREATININE-BSD FRML MDRD: >90 ML/MIN/1.73M2
GLUCOSE BLD-MCNC: 113 MG/DL (ref 70–125)
HBA1C MFR BLD: 5.9 % (ref 0–5.6)
HDLC SERPL-MCNC: 45 MG/DL
LDLC SERPL CALC-MCNC: 160 MG/DL
MICROALBUMIN UR-MCNC: 0.8 MG/DL (ref 0–1.99)
MICROALBUMIN/CREAT UR: 8.9 MG/G CR
POTASSIUM BLD-SCNC: 5.2 MMOL/L (ref 3.5–5)
PROT SERPL-MCNC: 7 G/DL (ref 6–8)
SODIUM SERPL-SCNC: 133 MMOL/L (ref 136–145)
TRIGL SERPL-MCNC: 142 MG/DL

## 2021-10-04 PROCEDURE — 80053 COMPREHEN METABOLIC PANEL: CPT | Performed by: FAMILY MEDICINE

## 2021-10-04 PROCEDURE — 82043 UR ALBUMIN QUANTITATIVE: CPT | Performed by: FAMILY MEDICINE

## 2021-10-04 PROCEDURE — 80061 LIPID PANEL: CPT | Performed by: FAMILY MEDICINE

## 2021-10-04 PROCEDURE — 91300 COVID-19,PF,PFIZER (12+ YRS): CPT | Performed by: FAMILY MEDICINE

## 2021-10-04 PROCEDURE — 86803 HEPATITIS C AB TEST: CPT | Performed by: FAMILY MEDICINE

## 2021-10-04 PROCEDURE — 99215 OFFICE O/P EST HI 40 MIN: CPT | Mod: 25 | Performed by: FAMILY MEDICINE

## 2021-10-04 PROCEDURE — 83036 HEMOGLOBIN GLYCOSYLATED A1C: CPT | Performed by: FAMILY MEDICINE

## 2021-10-04 PROCEDURE — 36415 COLL VENOUS BLD VENIPUNCTURE: CPT | Performed by: FAMILY MEDICINE

## 2021-10-04 PROCEDURE — 0004A COVID-19,PF,PFIZER (12+ YRS): CPT | Performed by: FAMILY MEDICINE

## 2021-10-04 RX ORDER — CLOBETASOL PROPIONATE 0.5 MG/G
CREAM TOPICAL
Qty: 30 G | Refills: 5 | Status: SHIPPED | OUTPATIENT
Start: 2021-10-04

## 2021-10-04 RX ORDER — FEXOFENADINE HCL AND PSEUDOEPHEDRINE HCL 180; 240 MG/1; MG/1
1 TABLET, EXTENDED RELEASE ORAL DAILY
Qty: 60 TABLET | Refills: 6 | Status: SHIPPED | OUTPATIENT
Start: 2021-10-04 | End: 2022-01-03

## 2021-10-04 RX ORDER — FEXOFENADINE HCL AND PSEUDOEPHEDRINE HCL 180; 240 MG/1; MG/1
1 TABLET, EXTENDED RELEASE ORAL DAILY
COMMUNITY
End: 2021-10-04

## 2021-10-04 RX ORDER — TIOTROPIUM BROMIDE 18 UG/1
18 CAPSULE ORAL; RESPIRATORY (INHALATION) DAILY
Qty: 90 CAPSULE | Refills: 3 | Status: SHIPPED | OUTPATIENT
Start: 2021-10-04 | End: 2022-09-01

## 2021-10-04 RX ORDER — TAMSULOSIN HYDROCHLORIDE 0.4 MG/1
0.8 CAPSULE ORAL
Qty: 180 CAPSULE | Refills: 3 | Status: SHIPPED | OUTPATIENT
Start: 2021-10-04

## 2021-10-04 NOTE — LETTER
October 8, 2021      Nakul Iniguez  [address] [facility] [address]        Dear ,    I am writing to inform you of your test results.    It was nice to see you in the clinic this past week.  Your test results are all back now and I would like to review those results.     Your hemoglobin A1c was 5.9 which is excellent.  Your blood sugar looked good at 113.  Also note that the urine test showed no significant albumin.  The ratio of albumin/creatinine was 8.9 which is normal.  I suggest that you continue to take Metformin as you have been with no changes.    Your lipids are moderately elevated with a total cholesterol of 233 and would like to see that less than 200.  The HDL cholesterol which is the good cholesterol is 45 which is in the low normal range.  The LDL is the bad cholesterol 160 is elevated.  We like to see that below 100.  Your triglycerides were normal at 142.  Because of your elevated lipids and diabetes I do feel that there would be benefit for you to take a statin such as atorvastatin 40 mg daily.  Elevated cholesterol is one of the risk factors for cardiovascular disease and by lowering your LDL cholesterol it does help lower the risk for heart attacks and strokes.  I will send a new prescription for atorvastatin to your pharmacy.    The screening test for hepatitis C was negative.    The metabolic panel showed that your sodium was 133 which is mildly low.  The rest of your electrolytes were normal.  Your kidney and liver function was normal.     We will plan to see you back in the clinic in 6 months for a diabetic follow-up visit.  We will check some labs at that time as well.  Hopefully you get some benefit from increasing the tamsulosin.    Resulted Orders   Hemoglobin A1c   Result Value Ref Range    Hemoglobin A1C 5.9 (H) 0.0 - 5.6 %      Comment:      Normal <5.7%   Prediabetes 5.7-6.4%    Diabetes 6.5% or higher     Note: Adopted from ADA consensus guidelines.    Hepatitis C antibody   Result Value Ref Range    Hepatitis C Antibody Negative Negative    Narrative    Assay performance characteristics have not been established for newborns, infants, children (<18 years) or populations of immunocompromised or immunosuppressed patients.    Albumin Random Urine Quantitative with Creat Ratio   Result Value Ref Range    Microalbumin Urine mg/dL 0.80 0.00 - 1.99 mg/dL    Creatinine Urine mg/dL 90 mg/dL    Microalbumin Urine mg/g Cr 8.9 <=19.9 mg/g Cr    Narrative    Microalbumin, Random Urine   <2.0 mg/dL . . . . . . . . Normal   3.0-30.0 mg/dL . . . . . . Microalbuminuria   >30.0 mg/dL . . . . . .  . Clinical Proteinuria     Microalbumin/Creatinine Ratio, Random Urine   <20 mg/g . . . . .. . . . Normal    mg/g . . . . . . . Microalbuminuria   >300 mg/g . . . . . . . . Clinical Proteinuria   Lipid panel reflex to direct LDL Fasting   Result Value Ref Range    Cholesterol 233 (H) <=199 mg/dL    Triglycerides 142 <=149 mg/dL    Direct Measure HDL 45 >=40 mg/dL      Comment:      HDL Cholesterol Reference Range:     0-2 years:   No reference ranges established for patients under 2 years old  at [facility] for lipid analytes.    2-8 years:  Greater than 45 mg/dL     18 years and older:   Female: Greater than or equal to 50 mg/dL   Male:   Greater than or equal to 40 mg/dL    LDL Cholesterol Calculated 160 (H) <=129 mg/dL    Patient Fasting > 8hrs? Yes    Comprehensive metabolic panel   Result Value Ref Range    Sodium 133 (L) 136 - 145 mmol/L    Potassium 5.2 (H) 3.5 - 5.0 mmol/L    Chloride 99 98 - 107 mmol/L    Carbon Dioxide (CO2) 25 22 - 31 mmol/L    Anion Gap 9 5 - 18 mmol/L    Urea Nitrogen 10 8 - 22 mg/dL    Creatinine 0.76 0.70 - 1.30 mg/dL    Calcium 9.5 8.5 - 10.5 mg/dL    Glucose 113 70 - 125 mg/dL    Alkaline Phosphatase 62 45 - 120 U/L    AST 15 0 - 40 U/L    ALT 22 0 - 45 U/L    Protein Total 7.0 6.0 - 8.0 g/dL    Albumin 4.1 3.5 - 5.0 g/dL    Bilirubin  Total 0.5 0.0 - 1.0 mg/dL    GFR Estimate >90 >60 mL/min/1.73m2      Comment:      As of July 11, 2021, eGFR is calculated by the CKD-EPI creatinine equation, without race adjustment. eGFR can be influenced by muscle mass, exercise, and diet. The reported eGFR is an estimation only and is only applicable if the renal function is stable.       If you have any questions or concerns, please call the clinic at the number listed above.       Sincerely,        Jermain Joe MD

## 2021-10-05 ENCOUNTER — TELEPHONE (OUTPATIENT)
Dept: FAMILY MEDICINE | Facility: CLINIC | Age: 71
End: 2021-10-05

## 2021-10-05 PROBLEM — F17.200 NICOTINE DEPENDENCE WITH CURRENT USE: Status: ACTIVE | Noted: 2021-10-05

## 2021-10-05 PROBLEM — J44.1 COPD WITH RESPIRATORY DISTRESS, ACUTE (H): Status: ACTIVE | Noted: 2021-10-05

## 2021-10-05 PROBLEM — N40.1 BENIGN PROSTATIC HYPERPLASIA WITH LOWER URINARY TRACT SYMPTOMS, SYMPTOM DETAILS UNSPECIFIED: Status: ACTIVE | Noted: 2021-10-05

## 2021-10-05 PROBLEM — E78.2 MIXED HYPERLIPIDEMIA: Status: ACTIVE | Noted: 2021-10-05

## 2021-10-05 LAB — HCV AB SERPL QL IA: NEGATIVE

## 2021-10-05 NOTE — TELEPHONE ENCOUNTER
Reason for Call:  Request for results:    Name of test or procedure: LABS    Date of test of procedure: 10/04/21    Location of the test or procedure:  CLINIC    OK to leave the result message on voice mail or with a family member? YES    Phone number Patient can be reached at:  Home number on file [phone] (home)    Additional comments: N/A    Call taken on 10/5/2021 at 9:51 AM by Von Perez

## 2021-10-08 RX ORDER — ATORVASTATIN CALCIUM 40 MG/1
40 TABLET, FILM COATED ORAL DAILY
Qty: 90 TABLET | Refills: 3 | Status: SHIPPED | OUTPATIENT
Start: 2021-10-08 | End: 2022-01-03

## 2021-10-11 ENCOUNTER — TELEPHONE (OUTPATIENT)
Dept: FAMILY MEDICINE | Facility: CLINIC | Age: 71
End: 2021-10-11

## 2021-10-11 NOTE — TELEPHONE ENCOUNTER
A1C is 5.9 in addition to other labs.  Pt received results letter. This was reviewed with him. No further questions or concerns.

## 2021-10-11 NOTE — TELEPHONE ENCOUNTER
Reason for Call:  Other call back    Detailed comments: please call him regarding his hgb, it is 5.9, has questions    Phone Number Patient can be reached at: Cell number on file:    Telephone Information:   Mobile [phone]       Best Time:     Can we leave a detailed message on this number? YES    Call taken on 10/11/2021 at 11:21 AM by Frances Grant

## 2021-10-16 ENCOUNTER — HEALTH MAINTENANCE LETTER (OUTPATIENT)
Age: 71
End: 2021-10-16

## 2022-01-03 ENCOUNTER — OFFICE VISIT (OUTPATIENT)
Dept: FAMILY MEDICINE | Facility: CLINIC | Age: 72
End: 2022-01-03
Payer: COMMERCIAL

## 2022-01-03 VITALS
OXYGEN SATURATION: 100 % | SYSTOLIC BLOOD PRESSURE: 122 MMHG | BODY MASS INDEX: 22.77 KG/M2 | WEIGHT: 153.7 LBS | DIASTOLIC BLOOD PRESSURE: 70 MMHG | HEART RATE: 77 BPM | HEIGHT: 69 IN

## 2022-01-03 DIAGNOSIS — F17.200 NICOTINE DEPENDENCE WITH CURRENT USE: ICD-10-CM

## 2022-01-03 DIAGNOSIS — E78.2 MIXED HYPERLIPIDEMIA: ICD-10-CM

## 2022-01-03 DIAGNOSIS — N40.1 BENIGN PROSTATIC HYPERPLASIA WITH LOWER URINARY TRACT SYMPTOMS, SYMPTOM DETAILS UNSPECIFIED: ICD-10-CM

## 2022-01-03 DIAGNOSIS — J30.1 NON-SEASONAL ALLERGIC RHINITIS DUE TO POLLEN: ICD-10-CM

## 2022-01-03 DIAGNOSIS — Z00.00 ENCOUNTER FOR MEDICARE ANNUAL WELLNESS EXAM: Primary | ICD-10-CM

## 2022-01-03 DIAGNOSIS — J44.9 CHRONIC OBSTRUCTIVE PULMONARY DISEASE, UNSPECIFIED COPD TYPE (H): ICD-10-CM

## 2022-01-03 DIAGNOSIS — Z86.0101 HX OF ADENOMATOUS POLYP OF COLON: ICD-10-CM

## 2022-01-03 DIAGNOSIS — E87.5 HYPERKALEMIA: ICD-10-CM

## 2022-01-03 DIAGNOSIS — Z12.5 SCREENING FOR PROSTATE CANCER: ICD-10-CM

## 2022-01-03 DIAGNOSIS — E11.9 TYPE 2 DIABETES MELLITUS WITHOUT COMPLICATION, WITHOUT LONG-TERM CURRENT USE OF INSULIN (H): ICD-10-CM

## 2022-01-03 LAB
ANION GAP SERPL CALCULATED.3IONS-SCNC: 10 MMOL/L (ref 5–18)
BUN SERPL-MCNC: 11 MG/DL (ref 8–28)
CALCIUM SERPL-MCNC: 9.8 MG/DL (ref 8.5–10.5)
CHLORIDE BLD-SCNC: 101 MMOL/L (ref 98–107)
CHOLEST SERPL-MCNC: 253 MG/DL
CO2 SERPL-SCNC: 25 MMOL/L (ref 22–31)
CREAT SERPL-MCNC: 0.78 MG/DL (ref 0.7–1.3)
FASTING STATUS PATIENT QL REPORTED: YES
GFR SERPL CREATININE-BSD FRML MDRD: >90 ML/MIN/1.73M2
GLUCOSE BLD-MCNC: 120 MG/DL (ref 70–125)
HBA1C MFR BLD: 5.9 % (ref 0–5.6)
HDLC SERPL-MCNC: 43 MG/DL
LDLC SERPL CALC-MCNC: 181 MG/DL
POTASSIUM BLD-SCNC: 5 MMOL/L (ref 3.5–5)
PSA SERPL-MCNC: 1.29 UG/L (ref 0–6.5)
SODIUM SERPL-SCNC: 136 MMOL/L (ref 136–145)
TRIGL SERPL-MCNC: 146 MG/DL

## 2022-01-03 PROCEDURE — G0103 PSA SCREENING: HCPCS | Performed by: FAMILY MEDICINE

## 2022-01-03 PROCEDURE — 99214 OFFICE O/P EST MOD 30 MIN: CPT | Mod: 25 | Performed by: FAMILY MEDICINE

## 2022-01-03 PROCEDURE — 99397 PER PM REEVAL EST PAT 65+ YR: CPT | Performed by: FAMILY MEDICINE

## 2022-01-03 PROCEDURE — 80061 LIPID PANEL: CPT | Performed by: FAMILY MEDICINE

## 2022-01-03 PROCEDURE — 36415 COLL VENOUS BLD VENIPUNCTURE: CPT | Performed by: FAMILY MEDICINE

## 2022-01-03 PROCEDURE — 83036 HEMOGLOBIN GLYCOSYLATED A1C: CPT | Performed by: FAMILY MEDICINE

## 2022-01-03 PROCEDURE — 80048 BASIC METABOLIC PNL TOTAL CA: CPT | Performed by: FAMILY MEDICINE

## 2022-01-03 ASSESSMENT — MIFFLIN-ST. JEOR: SCORE: 1449.05

## 2022-01-03 ASSESSMENT — ACTIVITIES OF DAILY LIVING (ADL): CURRENT_FUNCTION: NO ASSISTANCE NEEDED

## 2022-01-03 NOTE — PROGRESS NOTES
"SUBJECTIVE:   Nakul Iniguez is a 71 year old male who presents for Preventive Visit.      Patient has been advised of split billing requirements and indicates understanding: Yes   Are you in the first 12 months of your Medicare coverage?  No    Healthy Habits:     In general, how would you rate your overall health?  Good    Frequency of exercise:  2-3 days/week    Duration of exercise:  15-30 minutes    Do you usually eat at least 4 servings of fruit and vegetables a day, include whole grains    & fiber and avoid regularly eating high fat or \"junk\" foods?  Yes    Taking medications regularly:  Yes    Ability to successfully perform activities of daily living:  No assistance needed    Home Safety:  No safety concerns identified    Hearing Impairment:  Need to ask people to speak up or repeat themselves    In the past 6 months, have you been bothered by leaking of urine? Yes    In general, how would you rate your overall mental or emotional health?  Good      PHQ-2 Total Score: 0    Additional concerns today:  Yes    Do you feel safe in your environment? Yes    Have you ever done Advance Care Planning? (For example, a Health Directive, POLST, or a discussion with a medical provider or your loved ones about your wishes): No, advance care planning information given to patient to review.  Patient declined advance care planning discussion at this time.     Fall risk  Fallen 2 or more times in the past year?: No  Any fall with injury in the past year?: No    Cognitive Screening   1) Repeat 3 items (Leader, Season, Table)    2) Clock draw: NORMAL  3) 3 item recall:  Recalls 3 objects  Results: 3 items recalled: COGNITIVE IMPAIRMENT LESS LIKELY    Mini-CogTM Copyright EMELI Trejo. Licensed by the author for use in [facility]; reprinted with permission (samantha@.[facility]). All rights reserved.      Do you have sleep apnea, excessive snoring or daytime drowsiness?: no    Reviewed and updated as needed this visit by " clinical staff  Tobacco  Allergies  Meds             Reviewed and updated as needed this visit by Provider               Social History     Tobacco Use     Smoking status: Current Every Day Smoker     Smokeless tobacco: Never Used   Substance Use Topics     Alcohol use: Not Currently     If you drink alcohol do you typically have >3 drinks per day or >7 drinks per week? No    Alcohol Use 1/3/2022   Prescreen: >3 drinks/day or >7 drinks/week? Not Applicable   No flowsheet data found.        PROBLEMS TO ADD ON...  Concerns about nasal congestion using Afrin nasal spray for last week.  Has stopped his decongestant antihistamine mixture with pseudoephedrine.  Current providers sharing in care for this patient include:   Patient Care Team:  Jermain Joe MD as PCP - General (Family Medicine)  Jermain Joe MD as Assigned PCP    The following health maintenance items are reviewed in Epic and correct as of today:  Health Maintenance Due   Topic Date Due     SPIROMETRY  Never done     ADVANCE CARE PLANNING  Never done     COPD ACTION PLAN  Never done     EYE EXAM  Never done     ZOSTER IMMUNIZATION (1 of 2) Never done     LUNG CANCER SCREENING  Never done     AORTIC ANEURYSM SCREENING (SYSTEM ASSIGNED)  Never done     DTAP/TDAP/TD IMMUNIZATION (2 - Td or Tdap) 06/25/2019     A1C  01/04/2022     MEDICARE ANNUAL WELLNESS VISIT  01/13/2022     FALL RISK ASSESSMENT  01/13/2022     Labs reviewed in EPIC  BP Readings from Last 3 Encounters:   01/03/22 122/70   10/04/21 130/70   04/19/21 128/66    Wt Readings from Last 3 Encounters:   01/03/22 69.7 kg (153 lb 11.2 oz)   10/04/21 69.7 kg (153 lb 9.6 oz)   04/19/21 73.5 kg (162 lb)                  Patient Active Problem List   Diagnosis     Allergic Rhinitis     Diabetes mellitus, type 2 (H)     Nicotine dependence with current use     Benign prostatic hyperplasia with lower urinary tract symptoms, symptom details unspecified     COPD with respiratory distress, acute (H)      Mixed hyperlipidemia     Past Surgical History:   Procedure Laterality Date     [facility] APPENDECTOMY      Description: Appendectomy;  Recorded: 03/13/2009;  Comments: Age 20       Social History     Tobacco Use     Smoking status: Current Every Day Smoker     Smokeless tobacco: Never Used   Substance Use Topics     Alcohol use: Not Currently     No family history on file.      Current Outpatient Medications   Medication Sig Dispense Refill     albuterol (PROAIR HFA;PROVENTIL HFA;VENTOLIN HFA) 90 mcg/actuation inhaler [ALBUTEROL (PROAIR HFA;PROVENTIL HFA;VENTOLIN HFA) 90 MCG/ACTUATION INHALER] Inhale 2 puffs every 6 (six) hours as needed for wheezing. 3 each 0     blood glucose test (CONTOUR NEXT TEST STRIPS) strips [BLOOD GLUCOSE TEST (CONTOUR NEXT TEST STRIPS) STRIPS] Use 1 each As Directed daily. Use new strip for each glucose test, twice daily. 100 strip 6     blood-glucose meter (CONTOUR NEXT EZ METER) Misc [BLOOD-GLUCOSE METER (CONTOUR NEXT EZ METER) MISC] Use meter for twice daily glucose testing. 1 each 0     clobetasol (TEMOVATE) 0.05 % external cream [CLOBETASOL (TEMOVATE) 0.05 % CREAM] Apply to affected BID x 14D 30 g 5     fluticasone-salmeterol (ADVAIR) 250-50 MCG/DOSE inhaler Inhale 1 puff into the lungs 2 times daily 3 each 3     generic lancets (MICROLET LANCET) [GENERIC LANCETS (MICROLET LANCET)] Use one lancet for each finger poke, two times daily. 100 each 3     metFORMIN (GLUCOPHAGE XR) 500 MG 24 hr tablet [METFORMIN (GLUCOPHAGE XR) 500 MG 24 HR TABLET] Take 2 tablets (1,000 mg total) by mouth daily with breakfast. 180 tablet 3     tamsulosin (FLOMAX) 0.4 MG capsule Take 2 capsules (0.8 mg) by mouth daily (with dinner) 180 capsule 3     tiotropium (SPIRIVA HANDIHALER) 18 MCG inhaled capsule Inhale 1 capsule (18 mcg) into the lungs daily 90 capsule 3     triamcinolone (NASACORT) 55 MCG/ACT nasal aerosol Spray 2 sprays into both nostrils daily       Allergies   Allergen Reactions      "Hydrocodone-Acetaminophen Nausea     Penicillins Unknown     Recent Labs   Lab Test 01/03/22  0748 10/04/21  0815 04/12/21  1033 01/13/21  0945   A1C 5.9* 5.9* 6.9* 12.9*   * 160*  --  202*   HDL 43 45  --  38*   TRIG 146 142  --  222*   ALT  --  22  --   --    CR 0.78 0.76  --  0.87   GFRESTIMATED >90 >90  --  >60   GFRESTBLACK  --   --   --  >60   POTASSIUM 5.0 5.2*  --  5.0          Review of Systems  CONSTITUTIONAL: NEGATIVE for fever, chills, change in weight  INTEGUMENTARY/SKIN: Active eczema especially in the winter and uses Temovate cream as needed  EYES: Complains of dry eyes  ENT/MOUTH: NEGATIVE for ear, mouth and throat problems  ENT/MOUTH: Chronic stuffy nose recently stopped using his Allegra-D and has been using Afrin nasal spray.  Has some pressure in his sinus areas  RESP: NEGATIVE for significant cough or SOB  BREAST: NEGATIVE for masses, tenderness or discharge  CV: NEGATIVE for chest pain, palpitations or peripheral edema  GI: NEGATIVE for nausea, abdominal pain, heartburn, or change in bowel habits  : NEGATIVE for frequency, dysuria, or hematuria   male :negative for dysuria, hematuria, decreased urinary stream, erectile dysfunction and since increasing the tamsulosin 2.8 mg daily has had less nocturia still gets up 1-2 times per night but feels that his flow of urine has improved.  MUSCULOSKELETAL: NEGATIVE for significant arthralgias or myalgia  NEURO: NEGATIVE for weakness, dizziness or paresthesias  ENDOCRINE: NEGATIVE for temperature intolerance, skin/hair changes  HEME: NEGATIVE for bleeding problems  PSYCHIATRIC: NEGATIVE for changes in mood or affect    OBJECTIVE:   There were no vitals taken for this visit. Estimated body mass index is 23.35 kg/m  as calculated from the following:    Height as of 4/19/21: 1.727 m (5' 8\").    Weight as of 10/4/21: 69.7 kg (153 lb 9.6 oz).  Physical Exam  GENERAL: healthy, alert and no distress  EYES: Eyes grossly normal to inspection, PERRL and " conjunctivae and sclerae normal  HENT: normal cephalic/atraumatic, ear canals and TM's normal, nose and mouth without ulcers or lesions and nasal congestion with decreased airflow bilaterally in the nose with dry mouth and some erythema posterior pharynx related to his tobacco use  NECK: no adenopathy, no asymmetry, masses, or scars and thyroid normal to palpation  RESP: lungs clear to auscultation - no rales, rhonchi or wheezes and decreased breath sounds bilateral and throughout  CV: regular rate and rhythm, normal S1 S2, no S3 or S4, no murmur, click or rub, no peripheral edema and peripheral pulses strong  ABDOMEN: soft, nontender, no hepatosplenomegaly, no masses and bowel sounds normal  MS: no gross musculoskeletal defects noted, no edema  SKIN: no suspicious lesions or rashes  NEURO: Normal strength and tone, mentation intact and speech normal  PSYCH: mentation appears normal, affect normal/bright    Diagnostic Test Results:  Labs reviewed in Epic  Results for orders placed or performed in visit on 01/03/22   Hemoglobin A1c     Status: Abnormal   Result Value Ref Range    Hemoglobin A1C 5.9 (H) 0.0 - 5.6 %   Basic metabolic panel     Status: Normal   Result Value Ref Range    Sodium 136 136 - 145 mmol/L    Potassium 5.0 3.5 - 5.0 mmol/L    Chloride 101 98 - 107 mmol/L    Carbon Dioxide (CO2) 25 22 - 31 mmol/L    Anion Gap 10 5 - 18 mmol/L    Urea Nitrogen 11 8 - 28 mg/dL    Creatinine 0.78 0.70 - 1.30 mg/dL    Calcium 9.8 8.5 - 10.5 mg/dL    Glucose 120 70 - 125 mg/dL    GFR Estimate >90 >60 mL/min/1.73m2   Lipid panel reflex to direct LDL Fasting     Status: Abnormal   Result Value Ref Range    Cholesterol 253 (H) <=199 mg/dL    Triglycerides 146 <=149 mg/dL    Direct Measure HDL 43 >=40 mg/dL    LDL Cholesterol Calculated 181 (H) <=129 mg/dL    Patient Fasting > 8hrs? Yes    PSA, screen     Status: Normal   Result Value Ref Range    Prostate Specific Antigen Screen 1.29 0.00 - 6.50 ug/L    Narrative     Assay Method is Abbott Prostate-Specific Antigen (PSA)  Standard-WHO 1st International (90:10)       ASSESSMENT / PLAN:   Nakul was seen today for wellness visit and blood draw.    Diagnoses and all orders for this visit:    Encounter for Medicare annual wellness exam  Encouraged the patient to go to the pharmacy to get a Tdap and shingles vaccine  Future considerations for spirometry  Lung cancer screening would be a good idea and would recommend that  AAA screening also would be a good idea    Type 2 diabetes mellitus without complication, without long-term current use of insulin (H)  -     Hemoglobin A1c, normal at 5.9 and will continue with Metformin 1000 mg daily with breakfast    Non-seasonal allergic rhinitis due to pollen  -     triamcinolone (NASACORT) 55 MCG/ACT nasal aerosol; Spray 2 sprays into both nostrils daily  Instructed to stop Afrin  I would prefer that he not use the pseudoephedrine he either  Mixed hyperlipidemia  -     Lipid panel reflex to direct LDL Fasting, 253/146/43/181  I have prescribed atorvastatin a few months ago after his last lipids and he declined to pick those up and start him and states he has been using a plant-based approach to lower his cholesterol which has not been successful.  3 months ago his total cholesterol was 233, HDL 45 and LDL was 160.  He feels that it is likely he will have side effects from the statin and shared with him that side effects to the point of needing to stop the medicine may occur in about 5 out of 100 people.  He was concerned about GI side effects but told him the most common side effect can be muscle aching.  I recommendation is still to use a statin and to help him be successful we could use a lower dose statin than usual to gain some benefit and hopefully reduce risk of side effects.  The 10-year ASCVD risk score ([place] KIRSTEN Jr., et al., 2013) is: 41.3%    Values used to calculate the score:      Age: 71 years      Sex: Male      Is Non-  ": No      Diabetic: Yes      Tobacco smoker: Yes      Systolic Blood Pressure: 122 mmHg      Is BP treated: No      HDL Cholesterol: 43 mg/dL      Total Cholesterol: 253 mg/dL    Benign prostatic hyperplasia with lower urinary tract symptoms, symptom details unspecified  Urination has improved with increasing the tamsulosin to 0.8 mg daily    Nicotine dependence with current use  The patient presently is smoking 1/2 packs of cigarettes a day  Has tried to use patches but quality of the patches that he gets do not stay on through the night.  Considering Nicorette gum  Smoking cessation the most important decision that he can make for his health    Chronic obstructive pulmonary disease, unspecified COPD type (H)  The patient feels that his COPD is well controlled using albuterol inhaler for rescue.  Also uses Advair 250-50 mcg 1 puff twice a day  Spiriva 18 mcg 1 capsule daily per inhalation    Hyperkalemia  -     Basic metabolic panel, currently potassium level is normal    Screening for prostate cancer  -     PSA, screen, normal at 1.29    Hx of adenomatous polyp of colon  Last colonoscopy 2021 with 2 polyps and 3-year follow-up plan    Patient has been advised of split billing requirements and indicates understanding: Yes  COUNSELING:  Reviewed preventive health counseling, as reflected in patient instructions       Consider AAA screening for ages 65-75 and smoking history       Regular exercise       Healthy diet/nutrition       Vision screening       Consider lung cancer screening for ages 55-80 years and 30 pack-year smoking history        Colon cancer screening       Prostate cancer screening    Estimated body mass index is 23.35 kg/m  as calculated from the following:    Height as of 4/19/21: 1.727 m (5' 8\").    Weight as of 10/4/21: 69.7 kg (153 lb 9.6 oz).        He reports that he has been smoking. He has never used smokeless tobacco.  Tobacco Cessation Action Plan:   Information offered: " Patient not interested at this time      Appropriate preventive services were discussed with this patient, including applicable screening as appropriate for cardiovascular disease, diabetes, osteopenia/osteoporosis, and glaucoma.  As appropriate for age/gender, discussed screening for colorectal cancer, prostate cancer, breast cancer, and cervical cancer. Checklist reviewing preventive services available has been given to the patient.    Reviewed patients plan of care and provided an AVS. The Basic Care Plan (routine screening as documented in Health Maintenance) for Nakul meets the Care Plan requirement. This Care Plan has been established and reviewed with the Patient.    [facility]  Follow-up 6 months chronic disease visit    Jermain Joe MD  [facility]    Identified Health Risks:

## 2022-01-03 NOTE — PATIENT INSTRUCTIONS
Patient Education   Personalized Prevention Plan  You are due for the preventive services outlined below.  Your care team is available to assist you in scheduling these services.  If you have already completed any of these items, please share that information with your care team to update in your medical record.  Health Maintenance Due   Topic Date Due     Breathing Capacity Test  Never done     COPD Action Plan  Never done     Eye Exam  Never done     Zoster (Shingles) Vaccine (1 of 2) Never done     LUNG CANCER SCREENING  Never done     AORTIC ANEURYSM SCREENING (SYSTEM ASSIGNED)  Never done     Diptheria Tetanus Pertussis (DTAP/TDAP/TD) Vaccine (2 - Td or Tdap) 06/25/2019     A1C Lab  01/04/2022     FALL RISK ASSESSMENT  01/13/2022

## 2022-01-04 PROBLEM — J44.9 CHRONIC OBSTRUCTIVE PULMONARY DISEASE, UNSPECIFIED COPD TYPE (H): Status: ACTIVE | Noted: 2021-10-05

## 2022-01-04 PROBLEM — Z86.0101 HX OF ADENOMATOUS POLYP OF COLON: Status: ACTIVE | Noted: 2022-01-04

## 2022-01-04 RX ORDER — TRIAMCINOLONE ACETONIDE 55 UG/1
2 SPRAY, METERED NASAL DAILY
COMMUNITY
Start: 2022-01-04

## 2022-01-04 NOTE — RESULT ENCOUNTER NOTE
Nakul,It was nice to see you in the clinic yesterday.  Your test results are back and I would like to go over those results.    The test that we did included a PSA which is the prostate cancer screening test which is normal at 1.29.    The metabolic panel showed that your electrolytes were normal including your calcium.  The blood glucose was 120.  The kidney function is the BUN, creatinine and GFR which is excellent.    Unfortunately over the last 3 months your lipids actually look worse with your total cholesterol at 253 compared to a value of 233 and the LDL cholesterol which is the bad cholesterol is now 181 compared to 160 compared to 3 months ago.  I know that you have longevity in your family and I hope the same for you.  I still feel that to things that you can do to not only preserve longevity but also make sure that your length of life has a good quality and not be impaired by disease such as a stroke or heart attack is to stop smoking and to take a statin for your lipids.  What ever I can do to help you with those 2 areas I am happy to do what I can.  I still feel that taking a statin has that greatest potential benefit to lower your cholesterol and your risk for a stroke or heart attack.    Your diabetes looks good with a A1c of 5.9 which is excellent and you should continue on Metformin 1000 mg daily.    Make sure that you continue to get your yearly eye exams  Hopefully the Nasacort or Flonase will help your nasal congestion  I would like to see you back in the clinic in about 6 months for a follow-up.    Best regards,Dr. Joe

## 2022-02-15 DIAGNOSIS — E78.2 MIXED HYPERLIPIDEMIA: ICD-10-CM

## 2022-02-15 RX ORDER — ATORVASTATIN CALCIUM 20 MG
20 TABLET ORAL DAILY
Qty: 90 TABLET | Refills: 3 | Status: SHIPPED | OUTPATIENT
Start: 2022-02-15

## 2022-02-15 RX ORDER — FEXOFENADINE HCL AND PSEUDOEPHEDRINE HCL 180; 240 MG/1; MG/1
1 TABLET, EXTENDED RELEASE ORAL DAILY
Qty: 90 TABLET | Refills: 3 | OUTPATIENT
Start: 2022-02-15

## 2022-02-15 NOTE — TELEPHONE ENCOUNTER
I do not like having patients take Allegra-D long-term.  This is especially important in men who are in their 70s who have problems with prostate enlargement.  This medicine also has a tendency to raise blood pressure and pulse which is not good for him.  Would like to find another way to manage his sinus congestion without a decongestant.  Using Allegra alone without the decongestant would be okay.    I did print another prescription for Lipitor that will need to be mailed to him.    Dr. Joe

## 2022-02-15 NOTE — TELEPHONE ENCOUNTER
Allegra D-Wants to restart. Please send to pharmacy. This was canceled during last appt because pt was going to stop for a short time.    Lipitor, pt lost rx and would like another one print and mailed to his home.

## 2022-02-17 DIAGNOSIS — J30.1 NON-SEASONAL ALLERGIC RHINITIS DUE TO POLLEN: Primary | ICD-10-CM

## 2022-02-17 NOTE — TELEPHONE ENCOUNTER
Pt understands why he should not be taking the Allegra-D long term. Pt is willing to try the Allegra without decongestant. Pt stated Nasacort works pretty good but does not work all night. The only thing that works all night is the Allegra-D. Pt said they have had their ducts cleaned and carpets steamed but is still having issues. Pt would like to know if there is anything else Dr. Joe could recommend and if so, he would like a [facility] message.     Annette Keita CMA.

## 2022-02-18 ENCOUNTER — TELEPHONE (OUTPATIENT)
Dept: FAMILY MEDICINE | Facility: CLINIC | Age: 72
End: 2022-02-18
Payer: COMMERCIAL

## 2022-02-18 NOTE — TELEPHONE ENCOUNTER
Prior Authorization Request  Who s requesting:  covermymeds  Pharmacy Name and Location: [facility]  Medication Name: XHANCE 93MCG  Insurance Plan: [facility] MEDICARE   Insurance Member ID Number:  [id number]  CoverMyMeds Key: [id number]  Informed patient that prior authorizations can take up to 10 business days for response:   ARTURO  Okay to leave a detailed message: NA

## 2022-02-25 NOTE — TELEPHONE ENCOUNTER
Central Prior Authorization Team   Phone: [phone]    PA Initiation    Medication: Xhance 93MCG/ACT exhaler suspension  Insurance Company: [employer] ([facility]) - Phone [phone] Fax [phone]  Pharmacy Filling the Rx: [id number] - [facility] [address] AT [facility] [address] [facility] [address]  Filling Pharmacy Phone: [phone]  Filling Pharmacy Fax:    Start Date: 2/25/2022

## 2022-02-26 ENCOUNTER — VIRTUAL VISIT (OUTPATIENT)
Dept: URGENT CARE | Facility: CLINIC | Age: 72
End: 2022-02-26
Payer: COMMERCIAL

## 2022-02-26 ENCOUNTER — NURSE TRIAGE (OUTPATIENT)
Dept: NURSING | Facility: CLINIC | Age: 72
End: 2022-02-26

## 2022-02-26 DIAGNOSIS — J01.90 ACUTE NON-RECURRENT SINUSITIS, UNSPECIFIED LOCATION: Primary | ICD-10-CM

## 2022-02-26 PROCEDURE — 99441 PR PHYSICIAN TELEPHONE EVALUATION 5-10 MIN: CPT

## 2022-02-26 RX ORDER — DOXYCYCLINE HYCLATE 100 MG
100 TABLET ORAL 2 TIMES DAILY
Qty: 14 TABLET | Refills: 0 | Status: SHIPPED | OUTPATIENT
Start: 2022-02-26 | End: 2022-03-05

## 2022-02-26 RX ORDER — PREDNISONE 20 MG/1
20 TABLET ORAL DAILY
Qty: 5 TABLET | Refills: 0 | Status: SHIPPED | OUTPATIENT
Start: 2022-02-26 | End: 2022-03-03

## 2022-02-26 NOTE — PROGRESS NOTES
Nakul is a 71 year old who is being evaluated via a billable telephone visit.        Assessment & Plan     Acute non-recurrent sinusitis, unspecified location    - doxycycline hyclate (VIBRA-TABS) 100 MG tablet; Take 1 tablet (100 mg) by mouth 2 times daily for 7 days  - predniSONE (DELTASONE) 20 MG tablet; Take 1 tablet (20 mg) by mouth daily for 5 days      15 minutes spent on the date of the encounter doing chart review, patient visit and documentation        See Patient Instructions    Return in about 1 week (around 3/5/2022), or if symptoms worsen or fail to improve.    Virtual Urgent Care  [facility] VIRTUAL URGENT CARE    Subjective   Nakul is a 71 year old who presents for the following health issues     HPI     71-year-old male with a history of COPD and type 2 diabetes on oral medications presents to virtual urgent care via telephone visit for sinus issue.  Has been dealing with upper respiratory symptoms for over 3 weeks.  When symptoms first began had fever cough congestion.  Was Covid negative.  Recently has been working with his primary care provider who gave him a new nasal steroid spray.  Patient was unable to fill this since it was not covered by his insurance.  Is continuing to use his Nasacort and Allegra.  Today he use the [place] pot 3 times does seem to help his congestion only temporarily.  No fever, has pressure over the left eye and into the ear.  Is blowing his nose constantly and has postnasal drip.  Occasional productive cough.    Review of Systems   Constitutional, HEENT, cardiovascular, pulmonary, gi and gu systems are negative, except as otherwise noted.      Objective           Vitals:  No vitals were obtained today due to virtual visit.    Physical Exam   healthy, alert and no distress  PSYCH: Alert and oriented times 3; coherent speech, normal   rate and volume, able to articulate logical thoughts, able   to abstract reason, no tangential thoughts, no hallucinations   or  delusions  His affect is normal  RESP: No cough, no audible wheezing, able to talk in full sentences  Remainder of exam unable to be completed due to telephone visits            Phone call duration: 8 minutes

## 2022-02-26 NOTE — PATIENT INSTRUCTIONS
Patient Education     Sinusitis (Antibiotic Treatment)    The sinuses are air-filled spaces within the bones of the face. They connect to the inside of the nose. Sinusitis is an inflammation of the tissue that lines the sinuses. Sinusitis can occur during a cold. It can also happen due to allergies to pollens and other particles in the air. Sinusitis can cause symptoms of sinus congestion and a feeling of fullness. A sinus infection causes fever, headache, and facial pain. There is often green or yellow fluid draining from the nose or into the back of the throat (post-nasal drip). You have been given antibiotics to treat this condition.   Home care    Take the full course of antibiotics as instructed. Don't stop taking them, even when you feel better.    Drink plenty of water, hot tea, and other liquids as directed by the healthcare provider. This may help thin nasal mucus. It also may help your sinuses drain fluids.    Heat may help soothe painful areas of your face. Use a towel soaked in hot water. Or,  the shower and direct the warm spray onto your face. Using a vaporizer along with a menthol rub at night may also help soothe symptoms.     An expectorant with guaifenesin may help thin nasal mucus and help your sinuses drain fluids. Talk with your provider or pharmacists before taking an over-the-counter (OTC) medicine if you have any questions about it or its side effects..    You can use an OTC decongestant, unless a similar medicine was prescribed to you. Nasal sprays work the fastest. Use one that contains phenylephrine or oxymetazoline. First blow your nose gently. Then use the spray. Don't use these medicines more often than directed on the label. If you do, your symptoms may get worse. You may also take pills that contain pseudoephedrine. Don t use products that combine multiple medicines. This is because side effects may be increased. Read labels. You can also ask the pharmacist for help. (People  with high blood pressure should not use decongestants. They can raise blood pressure.) Talk with your provider or pharmacist if you have any questions about the medicine..    OTC antihistamines may help if allergies contributed to your sinusitis. Talk with your provider or pharmacist if you have any questions about the medicine..    Don't use nasal rinses or irrigation during an acute sinus infection, unless your healthcare provider tells you to. Rinsing may spread the infection to other areas in your sinuses.    Use acetaminophen or ibuprofen to control pain, unless another pain medicine was prescribed to you. If you have chronic liver or kidney disease or ever had a stomach ulcer, talk with your healthcare provider before using these medicines. Never give aspirin to anyone under age 18 who is ill with a fever. It may cause severe liver damage.    Don't smoke. This can make symptoms worse.    Follow-up care  Follow up with your healthcare provider, or as advised.   When to seek medical advice  Call your healthcare provider if any of these occur:     Facial pain or headache that gets worse    Stiff neck    Unusual drowsiness or confusion    Swelling of your forehead or eyelids    Symptoms don't go away in 10 days    Vision problems, such as blurred or double vision    Fever of 100.4 F (38 C) or higher, or as directed by your healthcare provider  Call 911  Call 911 if any of these occur:     Seizure    Trouble breathing    Feeling dizzy or faint    Fingernails, skin or lips look blue, purple , or gray  Prevention  Here are steps you can take to help prevent an infection:     Keep good hand washing habits.    Don t have close contact with people who have sore throats, colds, or other upper respiratory infections.    Don t smoke, and stay away from secondhand smoke.    Stay up to date with of your vaccines.  [employer] last reviewed this educational content on 12/1/2019 2000-2021 [employer]. All rights  reserved. This information is not intended as a substitute for professional medical care. Always follow your healthcare professional's instructions.

## 2022-02-26 NOTE — TELEPHONE ENCOUNTER
Pt seen by Dr. Joe.  Pt states he has allergies and was sick last week.  Pt states today he has left side of head and ear pressure and tenderness.  Pt wanting to know if he could get an antibiotic. Pt transferred to  to schedule pt for a Virtual Urgent Care appt for today.    Jacinta Bass RN, [facility] Nurse Advisor        Reason for Disposition    Earache    Additional Information    Negative: Severe difficulty breathing (e.g., struggling for each breath, speaks in single words)    Negative: Sounds like a life-threatening emergency to the triager    Negative: [1] Sinus infection AND [2] taking an antibiotic AND [3] symptoms continue    Negative: [1] Difficulty breathing AND [2] not from stuffy nose (e.g., not relieved by cleaning out the nose)    Negative: [1] SEVERE headache AND [2] fever    Negative: [1] Redness or swelling on the cheek, forehead or around the eye AND [2] fever    Negative: Fever > 104 F (40 C)    Negative: Patient sounds very sick or weak to the triager    Negative: [1] SEVERE pain AND [2] not improved 2 hours after pain medicine    Negative: [1] Redness or swelling on the cheek, forehead or around the eye AND [2] no fever    Negative: [1] Fever > 101 F (38.3 C) AND [2] age > 60    Negative: [1] Fever > 100.0 F (37.8 C) AND [2] bedridden (e.g., nursing home patient, CVA, chronic illness, recovering from surgery)    Negative: [1] Fever > 100.0 F (37.8 C) AND [2] diabetes mellitus or weak immune system (e.g., HIV positive, cancer chemo, splenectomy, organ transplant, chronic steroids)    Protocols used: SINUS PAIN OR CONGESTION-A-AH

## 2022-02-28 NOTE — TELEPHONE ENCOUNTER
PRIOR AUTHORIZATION DENIED    Medication: Xhance 93MCG/ACT exhaler suspension    Denial Date: 2/28/2022    Denial Rational:  Medication is only covered for nasal polyps           Appeal Information:  If provider would like to appeal please provide a letter of medical necessity and route back to PA team.

## 2022-03-02 ENCOUNTER — TELEPHONE (OUTPATIENT)
Dept: FAMILY MEDICINE | Facility: CLINIC | Age: 72
End: 2022-03-02
Payer: COMMERCIAL

## 2022-03-02 NOTE — TELEPHONE ENCOUNTER
Please inform [facility] pharmacy that this prescription was not approved by his insurance and he will either have to pay out-of-pocket or use a substitute such as Nasacort or Flonase.  Thank you,  Dr. Joe

## 2022-03-02 NOTE — TELEPHONE ENCOUNTER
Forms Request  Name of form/paperwork: [facility]  Have you been seen for this request: N/A  Do we have the form: yes, in providers inbox  When is form needed by: when done  How would you like the form returned: fax  Patient Notified form requests are processed in 3-5 business days: n/a    Okay to leave a detailed message? n/a

## 2022-03-08 ENCOUNTER — OFFICE VISIT (OUTPATIENT)
Dept: FAMILY MEDICINE | Facility: CLINIC | Age: 72
End: 2022-03-08
Payer: COMMERCIAL

## 2022-03-08 VITALS
HEART RATE: 76 BPM | SYSTOLIC BLOOD PRESSURE: 118 MMHG | BODY MASS INDEX: 22.66 KG/M2 | HEIGHT: 69 IN | TEMPERATURE: 97.8 F | DIASTOLIC BLOOD PRESSURE: 68 MMHG | OXYGEN SATURATION: 96 % | WEIGHT: 153 LBS

## 2022-03-08 DIAGNOSIS — H61.22 IMPACTED CERUMEN OF LEFT EAR: ICD-10-CM

## 2022-03-08 DIAGNOSIS — J01.90 ACUTE SINUSITIS WITH SYMPTOMS > 10 DAYS: ICD-10-CM

## 2022-03-08 DIAGNOSIS — B02.9 HERPES ZOSTER WITHOUT COMPLICATION: Primary | ICD-10-CM

## 2022-03-08 PROCEDURE — 69209 REMOVE IMPACTED EAR WAX UNI: CPT | Mod: LT | Performed by: PHYSICIAN ASSISTANT

## 2022-03-08 PROCEDURE — 99214 OFFICE O/P EST MOD 30 MIN: CPT | Mod: 25 | Performed by: PHYSICIAN ASSISTANT

## 2022-03-08 RX ORDER — GABAPENTIN 100 MG/1
100 CAPSULE ORAL 3 TIMES DAILY
Qty: 21 CAPSULE | Refills: 0 | Status: SHIPPED | OUTPATIENT
Start: 2022-03-08

## 2022-03-08 ASSESSMENT — ENCOUNTER SYMPTOMS
COUGH: 0
DIARRHEA: 0
FACIAL SWELLING: 0
CHILLS: 0
UNEXPECTED WEIGHT CHANGE: 0
FEVER: 0
EYE ITCHING: 0
CONSTIPATION: 0
SINUS PRESSURE: 1
LIGHT-HEADEDNESS: 0
ABDOMINAL PAIN: 0
EYE DISCHARGE: 0
ACTIVITY CHANGE: 0
SORE THROAT: 0
SINUS PAIN: 1
PALPITATIONS: 0
FATIGUE: 0
VOMITING: 0
PHOTOPHOBIA: 0
NAUSEA: 0
HEADACHES: 0
WHEEZING: 0
DIZZINESS: 0
SHORTNESS OF BREATH: 0
EYE REDNESS: 0
EYE PAIN: 0
RHINORRHEA: 1

## 2022-03-08 NOTE — PROGRESS NOTES
Progress Note  3/08/22     Chief Complaint   Patient presents with     Sinus Problem     left side tender, ear ache, sx on going for 3 weeks treated for sinus infection not geting better          HPI    Nakul Iniguez is a pleasant  71 year old year old male  who present to the clinic today for evaluation on sinus infection.  He developed sinus infection about 3 or 4 weeks ago with sinus pressure congestion.  He was started on doxycycline on 226 without any improvement.  He then did another virtual visit and was started on cefdinir on 3/5/2022.  2-1/2 weeks after he developed the sinus pressure and congestion he developed some pain to the left side of his head and pain behind the left ear.  Describes the pain as burning/aching.  The pain is constant.  Not noticed any lesions around that area.  He states that he did cut his hair is wondering if he cut his scalp when he did this.  He has not noticed any vision changes.  His appetite is good he has been drinking well.  He denies any dizziness lightheadedness.  No change in vision or hearing.  No fevers or chills.    ROS    Review of Systems   Constitutional: Negative for activity change, chills, fatigue, fever and unexpected weight change.   HENT: Positive for congestion, ear pain, rhinorrhea, sinus pressure and sinus pain. Negative for ear discharge, facial swelling, postnasal drip and sore throat.         Pain to the left side of head and behind left ear   Eyes: Negative for photophobia, pain, discharge, redness, itching and visual disturbance.   Respiratory: Negative for cough, shortness of breath and wheezing.    Cardiovascular: Negative for chest pain and palpitations.   Gastrointestinal: Negative for abdominal pain, constipation, diarrhea, nausea and vomiting.   Skin: Positive for rash.   Neurological: Negative for dizziness, light-headedness and headaches.            Patient Active Problem List   Diagnosis     Allergic Rhinitis     Diabetes mellitus, type 2  (H)     Nicotine dependence with current use     Benign prostatic hyperplasia with lower urinary tract symptoms, symptom details unspecified     Chronic obstructive pulmonary disease, unspecified COPD type (H)     Mixed hyperlipidemia     Hx of adenomatous polyp of colon        No past medical history on file.       Social History     Socioeconomic History     Marital status: Single     Spouse name: Not on file     Number of children: Not on file     Years of education: Not on file     Highest education level: Not on file   Occupational History     Not on file   Tobacco Use     Smoking status: Current Every Day Smoker     Smokeless tobacco: Never Used   Substance and Sexual Activity     Alcohol use: Not Currently     Drug use: Not Currently     Sexual activity: Not on file   Other Topics Concern     Not on file   Social History Narrative     Not on file     Social Determinants of Health     Financial Resource Strain: Not on file   Food Insecurity: Not on file   Transportation Needs: Not on file   Physical Activity: Not on file   Stress: Not on file   Social Connections: Not on file   Intimate Partner Violence: Not on file   Housing Stability: Not on file           Allergies   Allergen Reactions     Hydrocodone-Acetaminophen Nausea     Penicillins Unknown          Current Outpatient Medications   Medication     albuterol (PROAIR HFA;PROVENTIL HFA;VENTOLIN HFA) 90 mcg/actuation inhaler     blood glucose test (CONTOUR NEXT TEST STRIPS) strips     blood-glucose meter (CONTOUR NEXT EZ METER) Misc     cefdinir (OMNICEF) 300 MG capsule     clobetasol (TEMOVATE) 0.05 % external cream     fluticasone-salmeterol (ADVAIR) 250-50 MCG/DOSE inhaler     gabapentin (NEURONTIN) 100 MG capsule     generic lancets (MICROLET LANCET)     metFORMIN (GLUCOPHAGE XR) 500 MG 24 hr tablet     tamsulosin (FLOMAX) 0.4 MG capsule     tiotropium (SPIRIVA HANDIHALER) 18 MCG inhaled capsule     triamcinolone (NASACORT) 55 MCG/ACT nasal aerosol      "LIPITOR 20 MG tablet     No current facility-administered medications for this visit.            /68   Pulse 76   Temp 97.8  F (36.6  C)   Ht 1.753 m (5' 9\")   Wt 69.4 kg (153 lb)   SpO2 96%   BMI 22.59 kg/m       No results found for this or any previous visit (from the past 240 hour(s)).     Physical Exam:     Physical Exam  Vitals and nursing note reviewed.   Constitutional:       General: He is awake. He is not in acute distress.     Appearance: Normal appearance. He is well-developed and well-groomed. He is not ill-appearing, toxic-appearing or diaphoretic.   HENT:      Head: Normocephalic and atraumatic.      Comments: Pain with palpation over the left side of head behind the left ear.     Right Ear: Tympanic membrane, ear canal and external ear normal.      Left Ear: There is impacted cerumen.      Nose:      Right Sinus: Maxillary sinus tenderness present.      Left Sinus: Maxillary sinus tenderness present.      Comments: Pain with palpation to the maxillary sinuses more so on the left than the right.     Mouth/Throat:      Lips: Pink.      Mouth: Mucous membranes are moist.      Tongue: No lesions.      Pharynx: Oropharynx is clear. No pharyngeal swelling, oropharyngeal exudate, posterior oropharyngeal erythema or uvula swelling.      Tonsils: No tonsillar exudate or tonsillar abscesses.   Eyes:      General: Lids are normal.         Right eye: No discharge.         Left eye: No discharge.      Extraocular Movements: Extraocular movements intact.      Conjunctiva/sclera: Conjunctivae normal.   Neck:      Trachea: Trachea normal.   Cardiovascular:      Rate and Rhythm: Normal rate and regular rhythm.      Heart sounds: No murmur heard.    No friction rub. No gallop.   Pulmonary:      Effort: Pulmonary effort is normal. No accessory muscle usage, prolonged expiration or respiratory distress.      Breath sounds: No decreased breath sounds, wheezing, rhonchi or rales.   Musculoskeletal:      " Cervical back: Full passive range of motion without pain and neck supple.      Right lower leg: No edema.      Left lower leg: No edema.   Lymphadenopathy:      Cervical: No cervical adenopathy.      Right cervical: No superficial cervical adenopathy.     Left cervical: No superficial cervical adenopathy.   Skin:     Findings: Rash present.      Comments: Scabbed over erythematous lesions to the left side of scalp.  No drainage noted.  Evidence of infection over the lesions.    No lesions noted behind the posterior left ear.   Neurological:      Mental Status: He is alert.   Psychiatric:         Behavior: Behavior is cooperative.              Assessment/Plan:       ICD-10-CM    1. Herpes zoster without complication  B02.9 gabapentin (NEURONTIN) 100 MG capsule   2. Acute sinusitis with symptoms > 10 days  J01.90    3. Impacted cerumen of left ear  H61.22       #1 acute sinusitis-he states that since he is on the Ceftin near the last 3 days his sinus pressure congestion has improved a bit.  He is tolerating the medication well.  Have him continue this till its completion.  Ibuprofen and Tylenol for fevers aches and pains.  Push fluids.  The sinus rinses and Flonase was also recommended.    #2 possible herpes zoster-on physical exam he does have what looked to be vesicular lesions that are now blistered open and crusted over the area that he is having pain on the left side of his head.  No rashes noted behind the posterior aspect of his left ear.  These lesions are to the lateral aspect of his right head and do not cross midline.  He has 4 or 5 lesions.  I wonder if this was a herpes zoster infection.  He is out of the window for antivirals.  Due to the pain that he continues to have I will start him on gabapentin 100 mg 3 times a day.  Side effects of this medication were discussed.  He is not to drive with this medication if it makes him sleepy.    #3 serum impaction of the left ear-he did have a serum infection of  the left ear.  Nursing staff was able to flush this.  Tolerated the procedure well.  I did visualize the tympanic membrane after the flushing and there was no trauma.  No evidence of infection.    I did recommend having him continue the cefdinir as this is improving with his sinus infection and taking the gabapentin for the pain.  If there is no improvement towards the end of the antibiotics I would like him to reach out for follow-up.  If he develops new or worsening symptoms including change in vision hearing or balance issues or decreased hearing he should let us know.        Jayy Rick PA-C

## 2022-03-15 ENCOUNTER — NURSE TRIAGE (OUTPATIENT)
Dept: NURSING | Facility: CLINIC | Age: 72
End: 2022-03-15
Payer: COMMERCIAL

## 2022-03-15 NOTE — TELEPHONE ENCOUNTER
Pt notified. Pt wants to know if he can use both the Benadryl pill form in additional to the benadryl cream. Per Tong orlando PA-C- yes.

## 2022-03-15 NOTE — TELEPHONE ENCOUNTER
"Seen 3/8 - shingles on L side of head.Finished all the gabapentin he received. States he is \"85% better\". Pain is gone. Only complaint now is itching on one spot on top of head where shingles was.     Wants to use Benadryl at night for the itching. Care advice says do not use Benadryl w/ prostate problems. Pt does have enlarged prostate, takes tamsulosin though has not taken for 3 wks - says not too much frequent urination or nocturia during this time.     Asks what can he take for itching if not Benadryl?         Reason for Disposition    Shingles, questions about    Additional Information    Negative: Difficult to awaken or acting confused (e.g., disoriented, slurred speech)    Negative: Sounds like a life-threatening emergency to the triager    Negative: [1] Localized rash AND [2] doesn't match the SYMPTOMS of shingles    Negative: [1] Back pain AND [2] doesn't match the SYMPTOMS of shingles    Negative: Shingles Vaccine (Recombinant Zoster Vaccine; RZV; Shingrix), questions about    Negative: Patient sounds very sick or weak to the triager    Negative: [1] Shingles rash (matches SYMPTOMS) AND [2] weak immune system (e.g., HIV positive,  cancer chemotherapy, chronic steroid treatment, splenectomy) AND [3] NOT taking antiviral medication    Negative: Shingles rash on the eyelid or tip of the nose    Negative: [1] Shingles rash of face AND [2] eye pain or blurred vision    Negative: [1] Shingles rash of face AND [2] facial weakness    Negative: [1] Shingles rash of face or ear AND [2] earache or ringing in the ear    Negative: [1] Shingles rash AND [2] spots start appearing other places on body    Negative: Fever > 100.4 F (38.0 C)    Negative: SEVERE pain (e.g., excruciating)    Negative: [1] Shingles rash (matches SYMPTOMS) AND [2] onset within past 72 hours    Negative: [1] Shingles rash AND [2] onset > 72 hours ago    Negative: [1] Looks infected (spreading redness, pus) AND [2] no fever    Negative: [1] " Shingle rash already diagnosed AND [2] weak immune system (e.g., HIV positive,  cancer chemotherapy, chronic steroid treatment, splenectomy) AND [3]  taking antiviral medication    Negative: Pain persisting > 1 month after rash disappears    Protocols used: SHINGLES-A-AH

## 2022-03-15 NOTE — TELEPHONE ENCOUNTER
Please tell the patient that he can use combination of topical 1% hydrocortisone cream/topical Benadryl or in place of topical Benadryl could use calamine lotion.  If he uses calamine lotion put on the hydrocortisone cream first and then the calamine on top of that.    Dr. Joe

## 2022-05-22 ENCOUNTER — HEALTH MAINTENANCE LETTER (OUTPATIENT)
Age: 72
End: 2022-05-22

## 2022-05-25 DIAGNOSIS — E11.9 TYPE 2 DIABETES MELLITUS WITHOUT COMPLICATION, WITHOUT LONG-TERM CURRENT USE OF INSULIN (H): ICD-10-CM

## 2022-05-26 RX ORDER — METFORMIN HCL 500 MG
TABLET, EXTENDED RELEASE 24 HR ORAL
Qty: 180 TABLET | Refills: 0 | Status: SHIPPED | OUTPATIENT
Start: 2022-05-26

## 2022-05-26 NOTE — TELEPHONE ENCOUNTER
Medication: metformin 500mg  Last Date Filled: 4/19/21  Last appointment addressing medication: 1/3/22  Last B/P:  BP Readings from Last 3 Encounters:   03/08/22 118/68   01/03/22 122/70   10/04/21 130/70     Last labs pertaining to refill:1/3/22      Pend medication and associate diagnosis before routing to Provider for review.       If patient has not been seen in over 1 year, pend 30 day supply and notify patient they are due for an appointment before any further refills.

## 2022-05-26 NOTE — TELEPHONE ENCOUNTER
"Last Written Prescription Date:  4/19/2021  Last Fill Quantity: 180,  # refills: 3   Last office visit provider:  3/8/2022 MICKIE Galdamez     metFORMIN (GLUCOPHAGE XR) 500 MG 24 hr tablet 180 tablet 3 4/19/2021  No   Sig - Route: Take 2 tablets (1,000 mg total) by mouth daily with breakfast. - Oral   Sent to pharmacy as: metFORMIN  mg tablet,extended release 24 hr (Glucophage XR)   E-Prescribing Status: Receipt confirmed by pharmacy (4/19/2021  8:36 AM CDT         Requested Prescriptions   Pending Prescriptions Disp Refills     metFORMIN (GLUCOPHAGE XR) 500 MG 24 hr tablet [Pharmacy Med Name: METFORMIN ER 500MG 24HR TABS] 180 tablet 3     Sig: TAKE 2 TABLETS(1000 MG) BY MOUTH DAILY WITH BREAKFAST       Biguanide Agents Passed - 5/26/2022  8:44 AM        Passed - Patient is age 10 or older        Passed - Patient has documented A1c within the specified period of time.     If HgbA1C is 8 or greater, it needs to be on file within the past 3 months.  If less than 8, must be on file within the past 6 months.     Recent Labs   Lab Test 01/03/22  0748   A1C 5.9*             Passed - Patient's CR is NOT>1.4 OR Patient's EGFR is NOT<45 within past 12 mos.     Recent Labs   Lab Test 01/03/22  0748 10/04/21  0815 01/13/21  0945   GFRESTIMATED >90   < > >60   GFRESTBLACK  --   --  >60    < > = values in this interval not displayed.       Recent Labs   Lab Test 01/03/22  0748   CR 0.78             Passed - Patient does NOT have a diagnosis of CHF.        Passed - Medication is active on med list        Passed - Recent (6 mo) or future (30 days) visit within the authorizing provider's specialty     Patient had office visit in the last 6 months or has a visit in the next 30 days with authorizing provider or within the authorizing provider's specialty.  See \"Patient Info\" tab in inbasket, or \"Choose Columns\" in Meds & Orders section of the refill encounter.                 Yamilet Schulz RN 05/26/22 10:03 AM  "

## 2022-09-01 ENCOUNTER — NURSE TRIAGE (OUTPATIENT)
Dept: NURSING | Facility: CLINIC | Age: 72
End: 2022-09-01

## 2022-09-01 DIAGNOSIS — J44.1 COPD WITH RESPIRATORY DISTRESS, ACUTE (H): ICD-10-CM

## 2022-09-01 RX ORDER — TIOTROPIUM BROMIDE 18 UG/1
18 CAPSULE ORAL; RESPIRATORY (INHALATION) DAILY
Qty: 30 CAPSULE | Refills: 0 | Status: SHIPPED | OUTPATIENT
Start: 2022-09-01

## 2022-09-01 NOTE — TELEPHONE ENCOUNTER
Called pharmacy and confirmed they received the prescription and they will fill it right now for him.     Annette Keita CMA.

## 2022-09-01 NOTE — TELEPHONE ENCOUNTER
Prescription sent to the pharmacy.  Please remind patient that he needs to come in to establish care with a new provider now that Dr. Joe has retired.

## 2022-09-01 NOTE — TELEPHONE ENCOUNTER
Nurse Triage SBAR          Is this a 2nd Level Triage? YES, LICENSED PRACTITIONER REVIEW IS REQUIRED    Situation: Patient needs an emergency supply sent to [facility] in [place]    Background: did not bring enough medication with him to his mother  and is out of his Spiriva     Assessment: Patient calling states he needs his script sent to [facility] in [place]   States he is in [place] for his mothers  and doesn't have enough medication with him  - need to be sent for one month- unable to break up the box     Protocol Recommended Disposition:   No disposition on file.    Recommendation: Since PCP is retired, advised that refill will need to be sent by the [facility] for refill.      Routed to provider    Does the patient meet one of the following criteria for ADS visit consideration? No    Reason for Disposition    Prescription refill request for ESSENTIAL medicine (i.e., likelihood of harm to patient if not taken) and triager unable to refill per department policy    Additional Information    Negative: New-onset or worsening symptoms, see that protocol (e.g., diarrhea, runny nose, sore throat)    Negative: Medicine question not related to refill or renewal    Negative: Caller (e.g., patient or pharmacist) requesting information about a new medicine    Negative: Caller requesting information unrelated to medicine    Protocols used: MEDICATION REFILL AND RENEWAL CALL-A-OH

## 2022-09-25 ENCOUNTER — HEALTH MAINTENANCE LETTER (OUTPATIENT)
Age: 72
End: 2022-09-25

## 2023-02-04 ENCOUNTER — HEALTH MAINTENANCE LETTER (OUTPATIENT)
Age: 73
End: 2023-02-04

## 2023-03-13 NOTE — PROGRESS NOTES
ASSESSMENT/PLAN:       1. Type 2 diabetes mellitus without complication, without long-term current use of insulin (H)     - Hemoglobin A1c, 5.9 which is at goal    - Albumin Random Urine Quantitative with Creat Ratio 8.9 which is normal    - Comprehensive metabolic panel, note that sodium is slightly low at 133 and potassium slightly elevated at 5.2 with a fasting blood sugar of 113    2. Mixed hyperlipidemia     - Lipid panel reflex to direct LDL Fasting, 233/142/45/160 which is a nice improvement from earlier this year but still elevated and with his history is that significant increased risk for cardiovascular event and would recommend a statin    The 10-year ASCVD risk score (Ramu CURTIS Jr., et al., 2013) is: 41.5%    Values used to calculate the score:      Age: 70 years      Sex: Male      Is Non- : No      Diabetic: Yes      Tobacco smoker: Yes      Systolic Blood Pressure: 130 mmHg      Is BP treated: No      HDL Cholesterol: 45 mg/dL      Total Cholesterol: 233 mg/dL    - Comprehensive metabolic panel, liver function tests are normal    3. High priority for 2019-nCoV vaccine     - COVID-19,PF,PFIZER    4. COPD with respiratory distress, acute (H)     - tiotropium (SPIRIVA HANDIHALER) 18 MCG inhaled capsule; Inhale 1 capsule (18 mcg) into the lungs daily  Dispense: 90 capsule; Refill: 3  - fluticasone-salmeterol (ADVAIR) 250-50 MCG/DOSE inhaler; Inhale 1 puff into the lungs 2 times daily  Dispense: 3 each; Refill: 3  -Future recommendation would recommend spirometry    5. Non-seasonal allergic rhinitis due to pollen     - fexofenadine-pseudoePHEDrine (ALLEGRA-D 24) 180-240 MG 24 hr tablet; Take 1 tablet by mouth daily  Dispense: 60 tablet; Refill: 6  We talked about the potential side effects of the decongestant on a daily basis however he has tried just plain Allegra without benefit.    6. Benign prostatic hyperplasia with lower urinary tract symptoms, symptom details unspecified  We  "will increase the tamsulosin to 0.8 mg daily  - tamsulosin (FLOMAX) 0.4 MG capsule; Take 2 capsules (0.8 mg) by mouth daily (with dinner)  Dispense: 180 capsule; Refill: 3    7. Encounter for hepatitis C screening test for low risk patient     - Hepatitis C antibody, pending    8. Nicotine dependence with current use  Offered assistance to quit smoking but declined    9. Eczema, unspecified type     - clobetasol (TEMOVATE) 0.05 % external cream; [CLOBETASOL (TEMOVATE) 0.05 % CREAM] Apply to affected BID x 14D  Dispense: 30 g; Refill: 5  -The patient's eczema has been worse this past year typically more of an issue just in the winter months but has bothered him some during the good weather months as well.  No significant rash noted today.    10. Health care maintenance     - REVIEW OF HEALTH MAINTENANCE PROTOCOL ORDERS  -Future considerations for AAA screening because of his history of diabetes mellitus as well as his smoking history  -Start statin  -smoking cessation    Health Maintenance Due   Topic     Diptheria Tetanus Pertussis (DTAP/TDAP/TD) Vaccine (2 - Td or Tdap)     Flu Vaccine (1)     Test results by letter  Follow-up 6 months chronic disease visit  Office visit E/M total time same days encounter 45 minutes    Jermain Joe MD      PROGRESS NOTE   10/5/2021    SUBJECTIVE:  [id number]  who presents for   Chief Complaint   Patient presents with     Imm/Inj     COVID-19 VACCINE     Diabetes     Sugar checks at home have been \"really good\".  typically.      The patient is seen today to establish care.  Previously cared for by Dr. Hale.  The patient has type 2 diabetes which is treated with Metformin 1000 mg in the morning with breakfast.  His blood sugars have been less than 130 when checked.  No regular exercise.  He is due for an eye exam.  No past history of diabetic retinopathy.  No history of diabetic nephropathy.  No neuropathy.  Patient's last hemoglobin A1c was 6.9 in April 2021.    The patient " has a history of hyperlipidemia with his last lipids in January 2021 which were severely elevated with a LDL of 202 and HDL of 38.  Note that the patient has lost about 5 pounds over the last 6 months.  Is tried to make some changes in his diet.    Patient is requesting a COVID-19 booster shot today.  He fits the criteria for qualifying and has been just over 6 months since he has had his last Pfizer COVID-19 vaccination.  He did not have any side effects from the Pfizer Covid vaccine.    The patient has a history of COPD which she feels it is controlled.  He uses Spiriva and Advair for maintenance inhalers and albuterol for rescue inhaler.  The patient does continue to smoke half a pack of cigarettes a day does not have interest in quitting at this time.    Allergies are all year-round and he uses Allegra-D on a daily basis.  He feels that that works well for him and he does not notice any side effects.    History of BPH with lower urinary tract symptoms primarily with some hesitancy and nocturia x2-3.  The patient has been on tamsulosin 0.4 mg daily which he felt helped for the first few months he took it but now does not really feel like there is much benefit.      Patient Active Problem List   Diagnosis     Allergic Rhinitis     Diabetes mellitus, type 2 (H)     Nicotine dependence with current use     Benign prostatic hyperplasia with lower urinary tract symptoms, symptom details unspecified     COPD with respiratory distress, acute (H)     Mixed hyperlipidemia       Current Outpatient Medications   Medication Sig Dispense Refill     albuterol (PROAIR HFA;PROVENTIL HFA;VENTOLIN HFA) 90 mcg/actuation inhaler [ALBUTEROL (PROAIR HFA;PROVENTIL HFA;VENTOLIN HFA) 90 MCG/ACTUATION INHALER] Inhale 2 puffs every 6 (six) hours as needed for wheezing. 3 each 0     blood glucose test (CONTOUR NEXT TEST STRIPS) strips [BLOOD GLUCOSE TEST (CONTOUR NEXT TEST STRIPS) STRIPS] Use 1 each As Directed daily. Use new strip for each  glucose test, twice daily. 100 strip 6     blood-glucose meter (CONTOUR NEXT EZ METER) Misc [BLOOD-GLUCOSE METER (CONTOUR NEXT EZ METER) MISC] Use meter for twice daily glucose testing. 1 each 0     clobetasol (TEMOVATE) 0.05 % external cream [CLOBETASOL (TEMOVATE) 0.05 % CREAM] Apply to affected BID x 14D 30 g 5     fexofenadine-pseudoePHEDrine (ALLEGRA-D 24) 180-240 MG 24 hr tablet Take 1 tablet by mouth daily 60 tablet 6     fluticasone-salmeterol (ADVAIR) 250-50 MCG/DOSE inhaler Inhale 1 puff into the lungs 2 times daily 3 each 3     generic lancets (MICROLET LANCET) [GENERIC LANCETS (MICROLET LANCET)] Use one lancet for each finger poke, two times daily. 100 each 3     metFORMIN (GLUCOPHAGE XR) 500 MG 24 hr tablet [METFORMIN (GLUCOPHAGE XR) 500 MG 24 HR TABLET] Take 2 tablets (1,000 mg total) by mouth daily with breakfast. 180 tablet 3     tamsulosin (FLOMAX) 0.4 MG capsule Take 2 capsules (0.8 mg) by mouth daily (with dinner) 180 capsule 3     tiotropium (SPIRIVA HANDIHALER) 18 MCG inhaled capsule Inhale 1 capsule (18 mcg) into the lungs daily 90 capsule 3       History   Smoking Status     Current Every Day Smoker   Smokeless Tobacco     Never Used           OBJECTIVE:      Recent Results (from the past 48 hour(s))   Hemoglobin A1c    Collection Time: 10/04/21  8:15 AM   Result Value Ref Range    Hemoglobin A1C 5.9 (H) 0.0 - 5.6 %   Albumin Random Urine Quantitative with Creat Ratio    Collection Time: 10/04/21  8:15 AM   Result Value Ref Range    Microalbumin Urine mg/dL 0.80 0.00 - 1.99 mg/dL    Creatinine Urine mg/dL 90 mg/dL    Microalbumin Urine mg/g Cr 8.9 <=19.9 mg/g Cr   Lipid panel reflex to direct LDL Fasting    Collection Time: 10/04/21  8:15 AM   Result Value Ref Range    Cholesterol 233 (H) <=199 mg/dL    Triglycerides 142 <=149 mg/dL    Direct Measure HDL 45 >=40 mg/dL    LDL Cholesterol Calculated 160 (H) <=129 mg/dL    Patient Fasting > 8hrs? Yes    Comprehensive metabolic panel    Collection  Time: 10/04/21  8:15 AM   Result Value Ref Range    Sodium 133 (L) 136 - 145 mmol/L    Potassium 5.2 (H) 3.5 - 5.0 mmol/L    Chloride 99 98 - 107 mmol/L    Carbon Dioxide (CO2) 25 22 - 31 mmol/L    Anion Gap 9 5 - 18 mmol/L    Urea Nitrogen 10 8 - 22 mg/dL    Creatinine 0.76 0.70 - 1.30 mg/dL    Calcium 9.5 8.5 - 10.5 mg/dL    Glucose 113 70 - 125 mg/dL    Alkaline Phosphatase 62 45 - 120 U/L    AST 15 0 - 40 U/L    ALT 22 0 - 45 U/L    Protein Total 7.0 6.0 - 8.0 g/dL    Albumin 4.1 3.5 - 5.0 g/dL    Bilirubin Total 0.5 0.0 - 1.0 mg/dL    GFR Estimate >90 >60 mL/min/1.73m2       Vitals:    10/04/21 0709   BP: 130/70   BP Location: Right arm   Patient Position: Sitting   Cuff Size: Adult Regular   Pulse: 70   SpO2: 96%   Weight: 69.7 kg (153 lb 9.6 oz)     Wt Readings from Last 3 Encounters:   10/04/21 69.7 kg (153 lb 9.6 oz)   04/19/21 73.5 kg (162 lb)   01/13/21 73.5 kg (162 lb)           Physical Exam:  GENERAL APPEARANCE: 70-year-old male very pleasant, NAD, well hydrated, well nourished  SKIN:  Normal skin turgor, no lesions/rashes   HEENT: moist mucous membranes, no rhinorrhea  NECK: Normal without adenopathy or masses, no carotid bruits  CV: RRR, no M/G/R   LUNGS: CTAB  ABDOMEN: S&NT, no masses or enlarged organs, no abdominal bruits  EXTREMITY: no edema and full ROM of all joints, pedal pulses decreased right side only a trace palpable and 2+ on the left foot ankle.  The patient has some calluses on the bottom of his feet which are not tender or infected.  Sensation monofilament 5.07 bilaterally fully intact with no deficits dorsal and plantar surface.  NEURO: no focal findings     none

## 2023-05-13 ENCOUNTER — HEALTH MAINTENANCE LETTER (OUTPATIENT)
Age: 73
End: 2023-05-13

## 2023-10-14 ENCOUNTER — HEALTH MAINTENANCE LETTER (OUTPATIENT)
Age: 73
End: 2023-10-14

## 2024-03-02 ENCOUNTER — HEALTH MAINTENANCE LETTER (OUTPATIENT)
Age: 74
End: 2024-03-02

## 2024-07-20 ENCOUNTER — HEALTH MAINTENANCE LETTER (OUTPATIENT)
Age: 74
End: 2024-07-20

## 2024-12-07 ENCOUNTER — HEALTH MAINTENANCE LETTER (OUTPATIENT)
Age: 74
End: 2024-12-07

## 2025-03-15 ENCOUNTER — HEALTH MAINTENANCE LETTER (OUTPATIENT)
Age: 75
End: 2025-03-15